# Patient Record
Sex: MALE | Race: WHITE | NOT HISPANIC OR LATINO | Employment: OTHER | URBAN - METROPOLITAN AREA
[De-identification: names, ages, dates, MRNs, and addresses within clinical notes are randomized per-mention and may not be internally consistent; named-entity substitution may affect disease eponyms.]

---

## 2018-08-24 ENCOUNTER — TRANSCRIBE ORDERS (OUTPATIENT)
Dept: ADMINISTRATIVE | Facility: HOSPITAL | Age: 83
End: 2018-08-24

## 2018-08-24 DIAGNOSIS — R05.9 COUGH: ICD-10-CM

## 2018-08-24 DIAGNOSIS — R52 PAIN: ICD-10-CM

## 2018-08-24 DIAGNOSIS — R63.4 WEIGHT LOSS: Primary | ICD-10-CM

## 2018-08-30 ENCOUNTER — HOSPITAL ENCOUNTER (OUTPATIENT)
Dept: RADIOLOGY | Facility: MEDICAL CENTER | Age: 83
Discharge: HOME | End: 2018-08-30
Payer: COMMERCIAL

## 2018-08-30 DIAGNOSIS — R05.9 COUGH: ICD-10-CM

## 2018-08-30 DIAGNOSIS — R63.4 WEIGHT LOSS: ICD-10-CM

## 2018-08-30 PROCEDURE — 74176 CT ABD & PELVIS W/O CONTRAST: CPT

## 2018-08-30 PROCEDURE — 71250 CT THORAX DX C-: CPT

## 2018-11-01 ENCOUNTER — HOSPITAL ENCOUNTER (INPATIENT)
Facility: HOSPITAL | Age: 83
LOS: 9 days | Discharge: NURSING, REHAB OR LONG-TERM CARE | DRG: 155 | End: 2018-11-10
Attending: INTERNAL MEDICINE | Admitting: SURGERY
Payer: COMMERCIAL

## 2018-11-01 DIAGNOSIS — R33.9 URINARY RETENTION: ICD-10-CM

## 2018-11-01 DIAGNOSIS — I10 ESSENTIAL HYPERTENSION: ICD-10-CM

## 2018-11-01 DIAGNOSIS — I95.1 ORTHOSTATIC HYPOTENSION: ICD-10-CM

## 2018-11-01 DIAGNOSIS — I48.91 NEW ONSET ATRIAL FIBRILLATION (HCC): ICD-10-CM

## 2018-11-01 DIAGNOSIS — E44.0 MODERATE PROTEIN-CALORIE MALNUTRITION (HCC): ICD-10-CM

## 2018-11-01 DIAGNOSIS — J39.2 NASOPHARYNGEAL MASS: Primary | ICD-10-CM

## 2018-11-01 PROBLEM — E11.9 TYPE 2 DIABETES MELLITUS, WITHOUT LONG-TERM CURRENT USE OF INSULIN (HCC): Status: ACTIVE | Noted: 2018-11-01

## 2018-11-01 PROBLEM — R29.6 RECURRENT FALLS: Status: ACTIVE | Noted: 2018-11-01

## 2018-11-01 LAB — GLUCOSE SERPL-MCNC: 119 MG/DL (ref 65–140)

## 2018-11-01 PROCEDURE — 82948 REAGENT STRIP/BLOOD GLUCOSE: CPT

## 2018-11-01 PROCEDURE — 93005 ELECTROCARDIOGRAM TRACING: CPT

## 2018-11-01 PROCEDURE — 99223 1ST HOSP IP/OBS HIGH 75: CPT | Performed by: INTERNAL MEDICINE

## 2018-11-01 RX ORDER — AMLODIPINE BESYLATE 5 MG/1
5 TABLET ORAL DAILY
Status: DISCONTINUED | OUTPATIENT
Start: 2018-11-02 | End: 2018-11-05

## 2018-11-01 RX ORDER — ATORVASTATIN CALCIUM 40 MG/1
40 TABLET, FILM COATED ORAL
Status: DISCONTINUED | OUTPATIENT
Start: 2018-11-02 | End: 2018-11-10 | Stop reason: HOSPADM

## 2018-11-01 RX ORDER — ACETAMINOPHEN 325 MG/1
650 TABLET ORAL EVERY 6 HOURS PRN
Status: DISCONTINUED | OUTPATIENT
Start: 2018-11-01 | End: 2018-11-10 | Stop reason: HOSPADM

## 2018-11-01 RX ORDER — LISINOPRIL 20 MG/1
20 TABLET ORAL EVERY 12 HOURS
Status: DISCONTINUED | OUTPATIENT
Start: 2018-11-01 | End: 2018-11-03

## 2018-11-01 RX ORDER — METOPROLOL SUCCINATE 50 MG/1
50 TABLET, EXTENDED RELEASE ORAL DAILY
Status: DISCONTINUED | OUTPATIENT
Start: 2018-11-02 | End: 2018-11-06

## 2018-11-01 RX ORDER — PANTOPRAZOLE SODIUM 40 MG/1
40 TABLET, DELAYED RELEASE ORAL
Status: DISCONTINUED | OUTPATIENT
Start: 2018-11-02 | End: 2018-11-10 | Stop reason: HOSPADM

## 2018-11-01 RX ADMIN — LISINOPRIL 20 MG: 20 TABLET ORAL at 22:59

## 2018-11-02 PROBLEM — I25.10 CAD (CORONARY ARTERY DISEASE): Status: ACTIVE | Noted: 2018-11-02

## 2018-11-02 LAB
ANION GAP SERPL CALCULATED.3IONS-SCNC: 3 MMOL/L (ref 4–13)
ATRIAL RATE: 228 BPM
BUN SERPL-MCNC: 15 MG/DL (ref 5–25)
CALCIUM SERPL-MCNC: 8.4 MG/DL (ref 8.3–10.1)
CHLORIDE SERPL-SCNC: 105 MMOL/L (ref 100–108)
CO2 SERPL-SCNC: 26 MMOL/L (ref 21–32)
CREAT SERPL-MCNC: 1.09 MG/DL (ref 0.6–1.3)
ERYTHROCYTE [DISTWIDTH] IN BLOOD BY AUTOMATED COUNT: 16.6 % (ref 11.6–15.1)
EST. AVERAGE GLUCOSE BLD GHB EST-MCNC: 120 MG/DL
GFR SERPL CREATININE-BSD FRML MDRD: 62 ML/MIN/1.73SQ M
GLUCOSE SERPL-MCNC: 102 MG/DL (ref 65–140)
GLUCOSE SERPL-MCNC: 109 MG/DL (ref 65–140)
GLUCOSE SERPL-MCNC: 114 MG/DL (ref 65–140)
GLUCOSE SERPL-MCNC: 132 MG/DL (ref 65–140)
GLUCOSE SERPL-MCNC: 139 MG/DL (ref 65–140)
HBA1C MFR BLD: 5.8 % (ref 4.2–6.3)
HCT VFR BLD AUTO: 33.8 % (ref 36.5–49.3)
HGB BLD-MCNC: 10.5 G/DL (ref 12–17)
MCH RBC QN AUTO: 25.5 PG (ref 26.8–34.3)
MCHC RBC AUTO-ENTMCNC: 31.1 G/DL (ref 31.4–37.4)
MCV RBC AUTO: 82 FL (ref 82–98)
PLATELET # BLD AUTO: 102 THOUSANDS/UL (ref 149–390)
PMV BLD AUTO: 11.8 FL (ref 8.9–12.7)
POTASSIUM SERPL-SCNC: 4 MMOL/L (ref 3.5–5.3)
QRS AXIS: 78 DEGREES
QRSD INTERVAL: 86 MS
QT INTERVAL: 436 MS
QTC INTERVAL: 424 MS
RBC # BLD AUTO: 4.12 MILLION/UL (ref 3.88–5.62)
SODIUM SERPL-SCNC: 134 MMOL/L (ref 136–145)
T WAVE AXIS: 20 DEGREES
VENTRICULAR RATE: 57 BPM
WBC # BLD AUTO: 6.69 THOUSAND/UL (ref 4.31–10.16)

## 2018-11-02 PROCEDURE — 97165 OT EVAL LOW COMPLEX 30 MIN: CPT

## 2018-11-02 PROCEDURE — 85027 COMPLETE CBC AUTOMATED: CPT | Performed by: INTERNAL MEDICINE

## 2018-11-02 PROCEDURE — 93010 ELECTROCARDIOGRAM REPORT: CPT | Performed by: INTERNAL MEDICINE

## 2018-11-02 PROCEDURE — 99232 SBSQ HOSP IP/OBS MODERATE 35: CPT | Performed by: INTERNAL MEDICINE

## 2018-11-02 PROCEDURE — 82948 REAGENT STRIP/BLOOD GLUCOSE: CPT

## 2018-11-02 PROCEDURE — G8987 SELF CARE CURRENT STATUS: HCPCS

## 2018-11-02 PROCEDURE — 83036 HEMOGLOBIN GLYCOSYLATED A1C: CPT | Performed by: INTERNAL MEDICINE

## 2018-11-02 PROCEDURE — 80048 BASIC METABOLIC PNL TOTAL CA: CPT | Performed by: INTERNAL MEDICINE

## 2018-11-02 PROCEDURE — 99223 1ST HOSP IP/OBS HIGH 75: CPT | Performed by: NEUROLOGICAL SURGERY

## 2018-11-02 RX ORDER — SODIUM CHLORIDE 9 MG/ML
75 INJECTION, SOLUTION INTRAVENOUS CONTINUOUS
Status: DISCONTINUED | OUTPATIENT
Start: 2018-11-02 | End: 2018-11-05

## 2018-11-02 RX ORDER — ECHINACEA PURPUREA EXTRACT 125 MG
1 TABLET ORAL EVERY 4 HOURS PRN
Status: DISCONTINUED | OUTPATIENT
Start: 2018-11-02 | End: 2018-11-04

## 2018-11-02 RX ADMIN — ATORVASTATIN CALCIUM 40 MG: 40 TABLET, FILM COATED ORAL at 16:09

## 2018-11-02 RX ADMIN — PANTOPRAZOLE SODIUM 40 MG: 40 TABLET, DELAYED RELEASE ORAL at 05:21

## 2018-11-02 RX ADMIN — ENOXAPARIN SODIUM 40 MG: 40 INJECTION SUBCUTANEOUS at 09:00

## 2018-11-02 RX ADMIN — LISINOPRIL 20 MG: 20 TABLET ORAL at 23:08

## 2018-11-02 RX ADMIN — ACETAMINOPHEN 650 MG: 325 TABLET, FILM COATED ORAL at 04:54

## 2018-11-02 RX ADMIN — LISINOPRIL 20 MG: 20 TABLET ORAL at 09:00

## 2018-11-02 RX ADMIN — METOPROLOL SUCCINATE 50 MG: 50 TABLET, EXTENDED RELEASE ORAL at 09:00

## 2018-11-02 RX ADMIN — AMLODIPINE BESYLATE 5 MG: 5 TABLET ORAL at 08:59

## 2018-11-02 RX ADMIN — SODIUM CHLORIDE 75 ML/HR: 0.9 INJECTION, SOLUTION INTRAVENOUS at 16:11

## 2018-11-02 NOTE — UTILIZATION REVIEW
[address] [place] [address] in the [facility] by Silvestre Sultana for 2017  Network Utilization Review Department  Phone: [phone]; Fax [phone]  ATTENTION: The Network Utilization Review Department is now centralized for our 7 Facilities  Please call with any questions or concerns to [phone] and carefully follow the prompts so that you are directed to the right person  All voicemails are confidential  Fax any determinations, approvals, denials, and requests for initial or continue stay review clinical to [phone]  Due to HIGH CALL volume, it would be easier if you could please send faxed requests to expedite your requests and in part, help us provide discharge notifications faster   /////////////////////////////////////////////////////////////////////////////////////////////////////////////////    Initial Clinical Review    Admission: Date/Time/Statement: 11/1/18 @ 2024     Orders Placed This Encounter   Procedures    Inpatient Admission     Standing Status:   Standing     Number of Occurrences:   1     Order Specific Question:   Admitting Physician     Answer:   Mechelle Fry [[id number]]     Order Specific Question:   Level of Care     Answer:   Med Surg [16]     Order Specific Question:   Estimated length of stay     Answer:   More than 2 Midnights     Order Specific Question:   Certification     Answer:   I certify that inpatient services are medically necessary for this patient for a duration of greater than two midnights  See H&P and MD Progress Notes for additional information about the patient's course of treatment  History of Illness:   80 y o  male who presents as a transfer from [facility] with 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  The patient had presented to rehab [facility] on 10/29/18 with recurrent falls  He recently was started on Lasix 40 mg b i d  by his primary care physician for lower extremity edema    He was noted to be orthostatic on presentation acute related to dehydration from diuretic use  Diuretics have since been discontinued  Patient was hydrated with IV fluids and had resolution of orthostatic hypotension prior to transfer  Evaluation which involved imaging studies of his head revealed a large nasopharyngeal mass for which patient was transferred to [address] [place] [address] for neurosurgical evaluation with Dr Jagjit Angelo    reports left nasal congestion over the past 6 weeks with transient relief when he uses Afrin  He also reports early morning headache relieved with Tylenol  An MRI of the brain done at [facility] showed a large left-sided nasopharyngeal/skull base mass measuring 6 9 x 5 5 cm which had grown in size since 2016 when it was 3 3 x 2 8 cm  Patient reports he had been evaluated 2 years ago at [facility] and at that time no intervention was recommended  98 6  57   18   163/65  88 5 kg (195 lb)    Abnormal Labs/Diagnostic Test Results:   Na  134  An gap  3  hgb  10 5  hct  33 8    Past Medical/Surgical History:   CAD, hemoptysis, HTN, prostate CA    Admitting Diagnosis: Nasal mass [J34 9]    Assessment/Plan:   Nasopharyngeal mass w/involvement of skull base -  Scheduled for neurosurgical evaluation/ intervention    Orthostatic hypotension - currently denies any dizziness but cautious/slow when standing up;  Monitor orthostatic bp's ;  No IVF at this time    New Onset AFIb -  Rate controlled on beta blocker  (Coumadinon hold pending neurosurgery evaluation)   ECHO unremarkable showing normal systolic function    Recurrent falls PTA -  Get PT/OT evals    VTE Prophylaxis: Enoxaparin (Lovenox)  / sequential compression device       Patient will be admitted on an Inpatient basis with an anticipated length of stay of  > 2 midnights     Justification for Hospital Stay:  Nasopharyngeal mass    Admission Orders:  Admit med surg  Sequential compression device to b/l LE  Consult neurosurgery  PT/OT eval and treat  Fall precautions  accucks qid w/sliding scale insulin  I/O q shift  Orthostatic bp's   Up w/assist  Lo carb diet    11/2/2018    98 6         HR 55-77  (48 x1)     RR 18       135/76       Scheduled Meds:   Current Facility-Administered Medications:  acetaminophen 650 mg Oral Q6H PRN Mat Mora MD   amLODIPine 5 mg Oral Daily Mat Mora MD   atorvastatin 40 mg Oral Daily With Susanna Pedersen MD   enoxaparin 40 mg Subcutaneous Daily Mat Mora MD   insulin lispro 1-5 Units Subcutaneous HS Mat Mora MD   insulin lispro 1-6 Units Subcutaneous TID AC Antione Hardy MD   lisinopril 20 mg Oral Q12H Mat Mora MD   metoprolol succinate 50 mg Oral Daily Mat Mora MD   pantoprazole 40 mg Oral Early Morning Mat Mora MD     (awaiting evaluation by neurosurgery at time of this review)

## 2018-11-02 NOTE — ASSESSMENT & PLAN NOTE
· POA at [facility]  Symptoms were attributed to dehydration as patient had been on Lasix for bilateral lower extremity edema  Symptoms said to have resolved with IV fluids prior to transfer  · Patient currently denies dizziness but reports he is cautious when getting up  · Will check repeat orthostatic vitals  · Hold off on further IV fluids at this time    Lasix discontinued

## 2018-11-02 NOTE — ASSESSMENT & PLAN NOTE
· Rate controlled    He was started on Coumadin at [facility] which is now on hold pending neurosurgery evaluation  · Continue beta-blocker  · Echocardiogram done at [facility] was unremarkable showing normal systolic function and no significant valvular disease

## 2018-11-02 NOTE — ASSESSMENT & PLAN NOTE
- transferred from [facility] for nasopharyngeal/skull base mass measuring 6 9 x 5 5 cm on CT imaging which previously measured 3 3 x 2 8 cm in July 2016  - would ideally need biopsy for definitive diagnosis and treatment plan - appreciate neurosurgery input who recommended ENT consult to proceed with biopsy - discussed with ENT after their discussion with family and plan will be to pursue transnasal approach biopsy in the outpatient setting approximately one week post-discharge  - PRN pain control and supportive care otherwise    - PRN nasal spray for congestion    - speech/swallow evaluation appreciated - now recommending mechanical soft diet with thin liquids

## 2018-11-02 NOTE — CONSULTS
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  2  HTN  3  New onset a-fib  4  Orthostatic hyptension  5  Recurrent falls  6  Diabetes mellitus type II    Plan:  · Exam:  Alert and oriented x 3, very nasally speech, slightly muffled, and not wearing dentures at the time of exam, strength 5/5 BUE and 4+/5 BLE, reflexes 3+ and brisk BUE, 2+ in BLE, +martin bilaterally, no clonus noted, slight left drift, JPS 3/3, DST intact  · Imaging reviewed personally and with attending  Results are as follows:  · Pending image input into PACS from CD from [facility]  · Repeat CT head STAT if GCS declines > 2pts/1hr  · Medical management per primary team  · DVT ppx: SCDs and Lovenox  · Mobilize as tolerated with assistance  · PT / OT  · Neurosurgery will continue to follow  Surgical intervention is dependent on imaging results  Will view imaging sent over from [facility] once uploaded to PACS and assess if additional imaging is needed at this time  Please call with questions or concerns    History of Present Illness   HPI: Dionicio Barker is a 80y o  year old male with PMH including HTN, new onset a-fib, orthostatic hypotension, and diabetes mellitus type II who presented as a transfer from [facility] for nasopharyngeal mass found on CT head  He was seen at [facility] for recurrent falls  He admits to 4 falls in the past 8 days  He reports every fall is preceded by dizziness and black spots in his vision and then once he hits the floor he wakes up  On three occasions he had help to get up but on the fourth fall he was alone and called the ambulance   He has associated neck and mid back pain, mostly located under right shoulder, which he considers a 5/10, stabbing, "nerve pain "  He complains of 6-7 weeks of severe left sided nasal congestion with sinus pain which is not made better with Afrin  At times, this makes breathing difficult, especially if he get right nasal congestion and must breathe through his mouth  He reports 5/10 frontal headaches every day for the past 1-2 weeks for which he takes tylenol for and this helps  He admits to unintentional 33lb weight loss in 3 months due to decreased appetite  He has some intermittent, mild nausea  Patient reports mild numbness in bilateral finger tips, which has started prior to falling  He has a burning sensation on bilateral posterior thighs and numbness and cold sensation in bilateral legs below the knees from neuropathy  He reports that family members have noticed a change in his voice and at times he can be hard to understand  He denies confusion, change in bowel or bladder, changes in vision or hearing  Patient states 2 years ago he was told he had a nasopharyngeal mass but no intervention was recommended at that time  Patient ambulates with a walker at all times  He states he used agranox? Then was switched to ecotrin? And then placed on warfarin at [facility]  He denies other blood thinner use at home  Review of Systems   Constitutional: Positive for appetite change and unexpected weight change  Negative for chills and fever  Fatigue: 33lbs in 3 months  HENT: Positive for congestion (L>>R), sinus pain, sinus pressure and voice change  Negative for hearing loss, sore throat, tinnitus and trouble swallowing  Eyes: Negative for visual disturbance  Respiratory: Negative for chest tightness  Trouble breathing when very congested   Cardiovascular: Negative for chest pain  Gastrointestinal: Positive for constipation (chronic) and nausea (mild)   Negative for abdominal pain, diarrhea and vomiting  Genitourinary: Negative for difficulty urinating  Musculoskeletal: Positive for arthralgias (hands), back pain and neck pain  Skin: Negative for rash  Allergic/Immunologic: Negative for environmental allergies and food allergies  Neurological: Positive for dizziness, speech difficulty, light-headedness, numbness and headaches  Negative for facial asymmetry and weakness  Hematological: Bruises/bleeds easily  Psychiatric/Behavioral: Negative for confusion  The patient is not nervous/anxious          Historical Information   Past Medical History:   Diagnosis Date    Coronary artery disease     Diabetes mellitus ([facility] )     Hemoptysis     Hypertension     Prostate cancer ([facility] )      Past Surgical History:   Procedure Laterality Date    BRONCHOSCOPY      CORONARY ANGIOPLASTY WITH STENT PLACEMENT      PROSTATECTOMY       History   Alcohol Use No     History   Drug Use No     History   Smoking Status    Former Smoker    Packs/day: 1 50    Years: 40 00    Quit date: 12/31/2001   Smokeless Tobacco    Never Used     Family History   Problem Relation Age of Onset    Heart disease Father        Meds/Allergies   all current active meds have been reviewed, current meds:   Current Facility-Administered Medications   Medication Dose Route Frequency    acetaminophen (TYLENOL) tablet 650 mg  650 mg Oral Q6H PRN    amLODIPine (NORVASC) tablet 5 mg  5 mg Oral Daily    atorvastatin (LIPITOR) tablet 40 mg  40 mg Oral Daily With Dinner    enoxaparin (LOVENOX) subcutaneous injection 40 mg  40 mg Subcutaneous Daily    insulin lispro (HumaLOG) 100 units/mL subcutaneous injection 1-5 Units  1-5 Units Subcutaneous HS    insulin lispro (HumaLOG) 100 units/mL subcutaneous injection 1-6 Units  1-6 Units Subcutaneous TID AC    lisinopril (ZESTRIL) tablet 20 mg  20 mg Oral Q12H    metoprolol succinate (TOPROL-XL) 24 hr tablet 50 mg  50 mg Oral Daily    pantoprazole (PROTONIX) EC tablet 40 mg 40 mg Oral Early Morning    and PTA meds:   None     Allergies   Allergen Reactions    Plavix [Clopidogrel] Rash       Objective   I/O       10/31 0701 - 11/01 0700 11/01 0701 - 11/02 0700 11/02 0701 - 11/03 0700    P  O    240    Total Intake(mL/kg)   240 (2 7)    Urine (mL/kg/hr)  611 730 (2 2)    Total Output   611 730    Net   -611 -490           Unmeasured Urine Occurrence  1 x           Physical Exam   Constitutional: He is oriented to person, place, and time  Vital signs are normal  He appears well-developed and well-nourished  He is cooperative  HENT:   Head: Normocephalic and atraumatic  Eyes: Pupils are equal, round, and reactive to light  EOM are normal  Left conjunctiva has a hemorrhage (left lower midline)  Neck: Normal range of motion  Cardiovascular: Normal rate  Pulmonary/Chest: Effort normal  No respiratory distress  Abdominal: Soft  There is no tenderness  Musculoskeletal:   No back midline tenderness  Paraspinal tenderness on right side under shoulder blade  Neurological: He is alert and oriented to person, place, and time  He has a normal Finger-Nose-Finger Test    Reflex Scores:       Bicep reflexes are 3+ on the right side and 3+ on the left side  Brachioradialis reflexes are 3+ on the right side and 3+ on the left side  Patellar reflexes are 2+ on the right side and 2+ on the left side  Achilles reflexes are 2+ on the right side and 2+ on the left side  Skin: Skin is warm, dry and intact  Psychiatric: He has a normal mood and affect  His behavior is normal  Judgment and thought content normal  Cognition and memory are normal    Speech was very nasally; patient did not have dentures in which made speech slightly muffled and difficult to understand     Neurologic Exam     Mental Status   Oriented to person, place, and time  Registration: recalls 2 of 3 objects (multiple choice had to be given for one word)  Recall at 5 minutes: recalls 1 of 3 objects  Follows 1 step commands  Attention: normal  Concentration: normal    Speech: (Slightly muffled and very nasally; patient not wearing dentures)  Level of consciousness: alert  Knowledge: good  Able to perform simple calculations  Able to name object  Able to repeat  Normal comprehension  Cranial Nerves     CN II   Right visual field deficit: none  Left visual field deficit: none     CN III, IV, VI   Pupils are equal, round, and reactive to light  Extraocular motions are normal    CN III: no CN III palsy  CN VI: no CN VI palsy  Nystagmus: none   Diplopia: none  Ophthalmoparesis: none  Upgaze: normal  Downgaze: normal  Conjugate gaze: present    CN V   Right facial sensation deficit: none  Left facial sensation deficit: none    CN VII   Right facial weakness: none  Left facial weakness: none    CN VIII   Hearing: intact    CN IX, X   CN IX normal    CN X normal      CN XI   Right trapezius strength: normal  Left trapezius strength: normal    CN XII   CN XII normal      Motor Exam   Muscle bulk: normal  Overall muscle tone: normal  Right arm pronator drift: absent  Left arm pronator drift: present (slight)    Strength   Strength 5/5 except as noted  BLE 4+/5     Sensory Exam   Light touch normal    Proprioception normal    DST intact     Gait, Coordination, and Reflexes     Coordination   Finger to nose coordination: normal    Tremor   Resting tremor: absent  Intention tremor: absent  Action tremor: absent    Reflexes   Right brachioradialis: 3+  Left brachioradialis: 3+  Right biceps: 3+  Left biceps: 3+  Right patellar: 2+  Left patellar: 2+  Right achilles: 2+  Left achilles: 2+  Right : 2+  Left : 2+  Right Trammell: present  Left Trammell: present  Right ankle clonus: absent  Left ankle clonus: absent      Vitals:Blood pressure 135/76, pulse 59, temperature 98 6 °F (37 °C), height 6' 1" (1 854 m), weight 88 5 kg (195 lb), SpO2 97 %  ,Body mass index is 25 73 kg/m²       Lab Results:     Results from last 7 days  Lab Units 11/02/18  0600   WBC Thousand/uL 6 69   HEMOGLOBIN g/dL 10 5*   HEMATOCRIT % 33 8*   PLATELETS Thousands/uL 102*       Results from last 7 days  Lab Units 11/02/18  0600   POTASSIUM mmol/L 4 0   CHLORIDE mmol/L 105   CO2 mmol/L 26   BUN mg/dL 15   CREATININE mg/dL 1 09   CALCIUM mg/dL 8 4                 No results found for: TROPONINT  ABG:No results found for: PHART, YLA7VWL, PO2ART, PSS5WWN, I0AMNTNR, BEART, SOURCE    Imaging Studies: I have personally reviewed pertinent reports  and I have personally reviewed pertinent films in PACS    EKG, Pathology, and Other Studies: I have personally reviewed pertinent reports  VTE Prophylaxis: Sequential compression device (Venodyne)  and Enoxaparin (Lovenox)    Code Status: Level 1 - Full Code  Advance Directive and Living Will:      Power of :    POLST:      Counseling / Coordination of Care  I spent 1 hour with the patient

## 2018-11-02 NOTE — ASSESSMENT & PLAN NOTE
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

## 2018-11-02 NOTE — PLAN OF CARE
Problem: DISCHARGE PLANNING - CARE MANAGEMENT  Goal: Discharge to post-acute care or home with appropriate resources  INTERVENTIONS:  - Conduct assessment to determine patient/family and health care team treatment goals, and need for post-acute services based on payer coverage, community resources, and patient preferences, and barriers to discharge  - Address psychosocial, clinical, and financial barriers to discharge as identified in assessment in conjunction with the patient/family and health care team  - Arrange appropriate level of post-acute services according to patient's   needs and preference and payer coverage in collaboration with the physician and health care team  - Communicate with and update the patient/family, physician, and health care team regarding progress on the discharge plan  - Arrange appropriate transportation to post-acute venues  - will follow for medically necessary resources  Outcome: Progressing

## 2018-11-02 NOTE — OCCUPATIONAL THERAPY NOTE
[address] Evaluation     Patient Name: Rakan Couch  QEDRB'X Date: 11/2/2018  Problem List  Patient Active Problem List   Diagnosis    Nasopharyngeal mass    Orthostatic hypotension    New onset atrial fibrillation (HCC)    Type 2 diabetes mellitus, without long-term current use of insulin ([facility] )    Essential hypertension    Recurrent falls     Past Medical History  Past Medical History:   Diagnosis Date    Coronary artery disease     Diabetes mellitus ([facility] )     Hemoptysis     Hypertension     Prostate cancer ([facility] )      Past Surgical History  Past Surgical History:   Procedure Laterality Date    BRONCHOSCOPY      CORONARY ANGIOPLASTY WITH STENT PLACEMENT      PROSTATECTOMY           11/02/18 1200   Note Type   Note type Eval only   Restrictions/Precautions   Weight Bearing Precautions Per Order No   Other Precautions Multiple lines;Telemetry; Fall Risk;Pain   Pain Assessment   Pain Assessment 0-10   Pain Score 5   Pain Location Leg   Pain Orientation Bilateral;Posterior   Home Living   Type of [address] [place] [address] Two level; Able to live on main level with bedroom/bathroom   Bathroom Shower/Tub (Pt completed sponge bathing PTA)   Bathroom Toilet (Commode above toilet)   [address] [facility] [address]   Additional Comments Pt lives in a [facility] with first floor set-up  PTA pt sponge bathed and req freq rest breaks during ADLs  Pt made meals that didnt req heavy pots  He was able to dress himself I'ly with increased time and mobilized I'ly using a rw  Prior Function   Level of [place] Needs assistance with IADLs; Independent with ADLs and functional mobility   Lives With Spouse;Daughter; Other (Comment)  (Pt lives with wife (dementia), daughter and g daughter)   [address] Help From Family   ADL Assistance Independent   IADLs Needs assistance   Falls in the last 6 months 1 to 4   Vocational Retired   Comments Pt was I with ADLs PTA (sponge bathing, increased time and rest breaks)  Pt req assistance with IADLs that require lifitng heavier things (meals with heavy pots)   Lifestyle   Autonomy Pt was previously I with ADLs, req some assistance with more strenuous IADLs and used a rw for functional mobility  Pt does not drive   Service to Others Pt is a retired  and   Intrinsic Gratification Pt enjoys codes and puzzles  Psychosocial   Psychosocial (WDL) WDL   Subjective   Subjective Pt spoke a lot about how he has gotten weaker over time and reports that he frequently drops items that he picks up  ADL   Eating Assistance 7  Independent   Grooming Assistance 6  Modified Independent   UB Bathing Assistance 5  Supervision/Setup   LB Bathing Assistance 5  Supervision/Setup   UB Dressing Assistance 6  Modified independent   LB Dressing Assistance 6  Modified independent   Toileting Assistance  4  Minimal Assistance   Additional Comments Pt req increased time will all ADL tasks  Transfers   Sit to Stand 4  Minimal assistance   Additional items Verbal cues  (VCs for safe hand placement during transfer)   Stand to Sit 5  Supervision   Functional Mobility   Functional Mobility 5  Supervision   Additional Comments Pt took ~5 small side steps to move from the bed to chair with rw   Additional items Rolling walker   Balance   Static Sitting Fair +   Dynamic Sitting Fair   Static Standing Fair   Dynamic Standing Fair   Ambulatory Fair   Activity Tolerance   Activity Tolerance Patient limited by fatigue   Medical Staff Made Aware Yes, RN Shahrzad   Nurse Made Aware Yes   RUE Assessment   RUE Assessment WFL   LUE Assessment   LUE Assessment WFL   Hand Function   Gross Motor Coordination Functional   Fine Motor Coordination Functional   Sensation   Additional Comments Pt reports tingling in his feet (reports he experienced this PTA)    pt reports that he can feel items in his hands, however he frequently drops things   Cognition   Overall Cognitive Status [facility] Arousal/Participation Alert; Cooperative;Responsive   Attention Within functional limits   Orientation Level Oriented X4   Memory Within functional limits   Following Commands Follows all commands and directions without difficulty   Comments Pt does not demonstrate any cognitive deficits upon eval   Assessment   Limitation Decreased ADL status; Decreased endurance;Decreased high-level ADLs; Decreased self-care trans   Prognosis Fair   Assessment Pt is a 80year old female seen for OT eval s/p transfer from [facility] with recurrent falls, orthostatic, and dehydration  Imaginign studies of his head revealed a large nasopharyngeal mass and pt was transferred to [facility] for neurosurgical evaluation  Pt with active OT orders and up with assistance orders  Pt lives with his wife (dementia), daughter and granddaughter in a [facility]  Pt only used the second floor PTA  PTA, pt completed ADLs I'ly, but required assistance for IADLs that required more strength (cooked meals that didn't req heavy pots and pans), and ambulated with a rw  Pt is currently functioning at his baseline level, and it is not clinically recommended that he continue to receive skilled occupational therapy services while in the hospital   Pt scored overall 60/100 on the Barthel Index  Based on the aforementioned OT evaluation, functional performance, and assessments, pt has been identified as a low complexity evaluation  Recommend home with family support upon d/c   OT orders will be d/c at this time  Please re-consult OT if medically appropriate     Recommendation   OT Discharge Recommendation Home with family support   OT - OK to Discharge Yes  (when medically stable)   Barthel Index   Feeding 10   Bathing 5   Grooming Score 5   Dressing Score 10   Bladder Score 5   Bowels Score 10   Toilet Use Score 5   Transfers (Bed/Chair) Score 10   Mobility (Level Surface) Score 0   Stairs Score 0   Barthel Index Score 60     Viky Olivier, MODE, OTR/L

## 2018-11-02 NOTE — SOCIAL WORK
Met with patient and explained CM program and CM role  Resides with wife in a house, 2 [address], bed/bathroom first floor  Prior to admission was independent with ambulation and ADL's  PCP Dr Olga Lechuga  Has a prescription plan  Use [facility]  He does notr drive  DME: walker  Denies HHC or IP rehab utilization  Denies mental health illness, IP or OP psyche care  Denies drug and/or alcohol abuse  Primary contact is [phone]  Does not have a POA  States his daughter [place] will take him home    CM reviewed d/c planning process including the following: identifying help at home, patient preference for d/c planning needs, Discharge Lounge, [facility] Meds to Bed program, availability of treatment team to discuss questions or concerns patient and/or family may have regarding understanding medications and recognizing signs and symptoms once discharged  CM also encouraged patient to follow up with all recommended appointments after discharge  Patient advised of importance for patient and family to participate in managing patients medical well being  Patient/caregiver received discharge checklist  Content reviewed  Patient/caregiver encouraged to participate in discharge plan of care prior to discharge home

## 2018-11-02 NOTE — ASSESSMENT & PLAN NOTE
· Presented to [facility] with recurrent falls attributed to orthostatic hypotension from diuretic use and dehydration  · Will obtain PT/OT evaluation

## 2018-11-02 NOTE — PROGRESS NOTES
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 on CT imaging  - would ideally need biopsy for definitive diagnosis and treatment plan  - PRN pain control and supportive care otherwise   - PRN nasal spray for congestion      Orthostatic hypotension   Assessment & Plan    - prior to transfer, etiology was presumed to be recent Lasix use for lower extremity edema coupled with mild dehydration  - continue to hold Lasix - start gentle IV fluid hydration - recheck morning orthostatics  - will appreciate cardiology input  - check echocardiogram especially in light of new onset atrial flutter     New onset atrial flutter   Assessment & Plan    - EKGs noted - currently rate controlled on Toprol-XL  - Coumadin held anticipating neurosurgical intervention/biopsy   - will appreciate cardiology input as persistent orthostasis may limit use of rate-controlling agents     Essential hypertension   Assessment & Plan    - continue Zestril/Norvasc/Toprol-XL     CAD (coronary artery disease)   Assessment & Plan    - status post prior stenting  - continue Lipitor/Zestril/Toprol-XL      Diabetes mellitus type 2   Assessment & Plan    - HbA1c well controlled at 5 8   - on SSI coverage per Accu-Cheks       VTE Prophylaxis:  Lovenox       SUBJECTIVE     Seen/examined earlier this morning with nursing during rounds  No acute overnight events  Patient states that his dizziness has somewhat improved from presentation yesterday  Denies any headaches or blurry vision at this time  Does complain of some nasal congestion however        OBJECTIVE     Vitals: Temp (24hrs), Av 6 °F (37 °C), Min:98 6 °F (37 °C), Max:98 6 °F (37 °C)    Temp:  [98 6 °F (37 °C)] 98 6 °F (37 °C)  HR:  [48-88] 59  BP: (106-163)/(51-76) 112/51  SpO2:  [89 %-99 %] 97 %  Body mass index is 25 73 kg/m²  Input and Output Summary (last 24 hours):        Intake/Output Summary (Last 24 hours) at 18 1607  Last data filed at 18 1300   Gross per 24 hour   Intake              480 ml   Output             1766 ml   Net            -1286 ml       Physical Exam:     GENERAL:  Well-developed/nourished - no immediate distress  HEAD:  Normocephalic - atraumatic - no sinus tenderness  EYES: PERRL - EOMI - mildly inflamed left conjunctiva  MOUTH:  Mucosa moist  NECK:  Supple - full range of motion  CARDIAC:  Regular rate/rhythm - S1/S2 positive  PULMONARY:  Clear to auscultation bilaterally - nonlabored respirations  ABDOMEN:  Soft - nontender/nondistended - active bowel sounds  MUSCULOSKELETAL:  Motor strength/range of motion deconditioned  NEUROLOGIC:  Alert/oriented   SKIN:  Chronic wrinkles/blemishes   PSYCHIATRIC:  Mood/affect stable      ADDITIONAL DATA       Labs & Recent Cultures:       Results from last 7 days  Lab Units 18  0600   WBC Thousand/uL 6 69   HEMOGLOBIN g/dL 10 5*   HEMATOCRIT % 33 8*   PLATELETS Thousands/uL 102*       Results from last 7 days  Lab Units 18  0600   POTASSIUM mmol/L 4 0   CHLORIDE mmol/L 105   CO2 mmol/L 26   BUN mg/dL 15   CREATININE mg/dL 1 09   CALCIUM mg/dL 8 4         Last 24 Hours Medication List:     Current Facility-Administered Medications:  acetaminophen 650 mg Oral Q6H PRN Ghislaine Deshpande MD   amLODIPine 5 mg Oral Daily Ghislaine Deshpande MD   atorvastatin 40 mg Oral Daily With Belgica Fabian MD   enoxaparin 40 mg Subcutaneous Daily Ghislaine Deshpande MD   insulin lispro 1-5 Units Subcutaneous HS Ghislaine Deshpande MD   insulin lispro 1-6 Units Subcutaneous TID AC Aniceto Powell MD   lisinopril 20 mg Oral Q12H Ghislaine Deshpande MD   metoprolol succinate 50 mg Oral Daily Tru Ortiz MD   pantoprazole 40 mg Oral Early Morning Tru Ortiz MD   sodium chloride 1 spray Each Nare Q4H PRN Sandy Sagastume MD   sodium chloride 75 mL/hr Intravenous Continuous Sandy Sagastume MD          Time Spent for Care: 33 minutes  More than 50% of total time spent on counseling and coordination of care as described above  Current Length of Stay: 1 day(s)      Code Status: Level 1 - Full Code         ** Please Note: This note is constructed using a voice recognition dictation system   **

## 2018-11-02 NOTE — ASSESSMENT & PLAN NOTE
- prior to transfer, etiology was presumed to be recent Lasix use for lower extremity edema coupled with mild dehydration  - discontinued Lasix - serial orthostatic checks every morning (ordered)   - cardiology input appreciated who are okay w/ higher baseline BP readings to offset positional drops   - CT angiogram of head/neck NOT showing carotid compression  - echocardiogram reveals an EF of 55% with trace MR/TR but no evidence of LV wall motion abnormalities per reading cardiologist  - cardiology discontinued IV fluids and Norvasc but recommending continuation of Toprol-XL and ACEI at current doses for atrial flutter and hypertension respectively   - Patient still orthostatic today and symptomatic  Will add thigh high compression stockings and Florineg 0 1mg daily   Monitor with changes

## 2018-11-02 NOTE — ASSESSMENT & PLAN NOTE
- continue Zestril/Toprol-XL - Norvasc discontinued by cardiology  - as stated above, cardiology is okay with slightly higher baseline resting BP to offset positional drop from orthostasis

## 2018-11-02 NOTE — H&P
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  Symptoms were attributed to dehydration as patient had been on Lasix for bilateral lower extremity edema  Symptoms said to have resolved with IV fluids prior to transfer  · Patient currently denies dizziness but reports he is cautious when getting up  · Will check repeat orthostatic vitals  · Hold off on further IV fluids at this time  Lasix discontinued     New onset atrial fibrillation (HCC)   Assessment & Plan    · Rate controlled  He was started on Coumadin at [facility] which is now on hold pending neurosurgery evaluation  · Continue beta-blocker  · Echocardiogram done at [facility] was unremarkable showing normal systolic function and no significant valvular disease     Type 2 diabetes mellitus, without long-term current use of insulin (HCC)   Assessment & Plan    No results found for: HGBA1C    No results for input(s): POCGLU in the last 72 hours  Blood Sugar Average: Last 72 hrs:    · Hold glipizide, last hemoglobin A1c was 5 9 on 7/9/18    Will check a repeat this admission  · Accu-Cheks a c  HS with correctional scale insulin for now  · Start on basal/bolus insulin schedule as needed after review of blood glucose levels     Essential hypertension   Assessment & Plan    · Continue atenolol, lisinopril, amlodipine     Recurrent falls   Assessment & Plan    · Presented to [facility] with recurrent falls attributed to orthostatic hypotension from diuretic use and dehydration  · Will obtain PT/OT evaluation         VTE Prophylaxis: Enoxaparin (Lovenox)  / sequential compression device     Code Status: Full Code    POLST: POLST form is not discussed and not completed at this time  Anticipated Length of Stay:  Patient will be admitted on an Inpatient basis with an anticipated length of stay of  > 2 midnights  Justification for Hospital Stay:  Nasopharyngeal mass    Chief Complaint:     Nasopharyngeal mass, transferred for neurosurgical evaluation    History of Present Illness:  Courtney Lopez is a 80 y o  male who presents as a transfer from [facility] with 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  The patient had [facility] on 10/29/18 with recurrent falls  He recently was started on Lasix 40 mg b i d  by his primary care physician for lower extremity edema  He was noted to be orthostatic on presentation acute related to dehydration from diuretic use  Diuretics have since been discontinued  Patient was hydrated with IV fluids and had resolution of orthostatic hypotension prior to transfer  Evaluation which involved imaging studies of his head revealed a large nasopharyngeal mass for which patient was transferred to [facility] for neurosurgical evaluation with Dr Dez Peraza  He reports specifically left nasal congestion over the past 6 weeks with transient relief when he uses Afrin  He also reports early morning headache relieved with Tylenol  An MRI of the brain done at [facility] showed a large left-sided nasopharyngeal/skull base mass measuring 6 9 x 5 5 cm which had grown in size since 2016 when it was 3 3 x 2 8 cm  Patient reports he had been evaluated 2 years ago at [facility] and at that time no intervention was recommended  Review of Systems   Constitutional: Negative      HENT: Positive for congestion, nosebleeds, sinus pain, sinus pressure and voice change  Hot potato voice   Eyes: Negative  Respiratory: Negative  Cardiovascular: Negative  Gastrointestinal: Positive for constipation  Negative for abdominal pain, nausea and vomiting  Genitourinary: Negative  Musculoskeletal: Negative  Skin: Negative  Neurological: Positive for headaches  Negative for dizziness, syncope and light-headedness  Psychiatric/Behavioral: Negative  All other systems reviewed and are negative  Past Medical History:   Diagnosis Date    Coronary artery disease     Diabetes mellitus ([facility] )     Hemoptysis     Hypertension     Prostate cancer ([facility] )        Past Surgical History:   Procedure Laterality Date    BRONCHOSCOPY      CORONARY ANGIOPLASTY WITH STENT PLACEMENT      PROSTATECTOMY         Family History:  Family History   Problem Relation Age of Onset    Heart disease Father        Home Meds:  all medications and allergies reviewed    Allergies: Allergies   Allergen Reactions    Plavix [Clopidogrel] Rash         Marital Status: Unknown     History   Alcohol Use No     History   Smoking Status    Former Smoker    Packs/day: 1 50    Years: 40 00    Quit date: 12/31/2001   Smokeless Tobacco    Not on file     History   Drug Use No           Physical Exam:   Vitals:   Height: 6' 1" (185 4 cm) (11/01/18 2053)  Weight - Scale: 88 5 kg (195 lb) (11/01/18 2053)    Physical Exam   Constitutional: He is oriented to person, place, and time  He appears well-developed and well-nourished  No distress  HENT:   Head: Normocephalic and atraumatic  Eyes: Conjunctivae and EOM are normal  Right eye exhibits no discharge  Left eye exhibits no discharge  No scleral icterus  Neck: Normal range of motion  Neck supple  Cardiovascular: Normal rate  An irregularly irregular rhythm present  No murmur heard  Pulmonary/Chest: Effort normal and breath sounds normal  No respiratory distress  Abdominal: Soft   Bowel sounds are normal  He exhibits no distension and no mass  There is no tenderness  Musculoskeletal: Normal range of motion  He exhibits no edema or tenderness  Soft tissue mass noted right calf laterally (likely lipoma)   Neurological: He is alert and oriented to person, place, and time  Skin: Skin is warm and dry  No rash noted  He is not diaphoretic  No erythema  Psychiatric: He has a normal mood and affect  His behavior is normal    Vitals reviewed  Lab Results: Lab results (CBC, BMP, LFTs, INR, procalcitonin) from [facility] reviewed and unremarkable      Imaging: MRI/CT brain from [facility] results reviewed      EKG, Pathology, and Other Studies Reviewed on Admission:   · EKG pending this admission    ** Please Note: Dragon 360 Dictation voice to text software may have been used in the creation of this document   **

## 2018-11-02 NOTE — ASSESSMENT & PLAN NOTE
- EKGs noted - currently rate controlled on Toprol-XL  - Coumadin will remain held anticipating biopsy within a week post discharge  - recent diagnosis at [facility] prior to transfer

## 2018-11-03 LAB
BASOPHILS # BLD AUTO: 0.02 THOUSANDS/ΜL (ref 0–0.1)
BASOPHILS NFR BLD AUTO: 0 % (ref 0–1)
EOSINOPHIL # BLD AUTO: 0.04 THOUSAND/ΜL (ref 0–0.61)
EOSINOPHIL NFR BLD AUTO: 1 % (ref 0–6)
ERYTHROCYTE [DISTWIDTH] IN BLOOD BY AUTOMATED COUNT: 16.3 % (ref 11.6–15.1)
GLUCOSE SERPL-MCNC: 122 MG/DL (ref 65–140)
GLUCOSE SERPL-MCNC: 127 MG/DL (ref 65–140)
GLUCOSE SERPL-MCNC: 129 MG/DL (ref 65–140)
GLUCOSE SERPL-MCNC: 135 MG/DL (ref 65–140)
HCT VFR BLD AUTO: 36 % (ref 36.5–49.3)
HGB BLD-MCNC: 11.1 G/DL (ref 12–17)
IMM GRANULOCYTES # BLD AUTO: 0.02 THOUSAND/UL (ref 0–0.2)
IMM GRANULOCYTES NFR BLD AUTO: 0 % (ref 0–2)
LYMPHOCYTES # BLD AUTO: 1.23 THOUSANDS/ΜL (ref 0.6–4.47)
LYMPHOCYTES NFR BLD AUTO: 22 % (ref 14–44)
MCH RBC QN AUTO: 25.6 PG (ref 26.8–34.3)
MCHC RBC AUTO-ENTMCNC: 30.8 G/DL (ref 31.4–37.4)
MCV RBC AUTO: 83 FL (ref 82–98)
MONOCYTES # BLD AUTO: 0.39 THOUSAND/ΜL (ref 0.17–1.22)
MONOCYTES NFR BLD AUTO: 7 % (ref 4–12)
NEUTROPHILS # BLD AUTO: 3.88 THOUSANDS/ΜL (ref 1.85–7.62)
NEUTS SEG NFR BLD AUTO: 70 % (ref 43–75)
NRBC BLD AUTO-RTO: 0 /100 WBCS
PLATELET # BLD AUTO: 100 THOUSANDS/UL (ref 149–390)
PMV BLD AUTO: 10.9 FL (ref 8.9–12.7)
RBC # BLD AUTO: 4.34 MILLION/UL (ref 3.88–5.62)
WBC # BLD AUTO: 5.58 THOUSAND/UL (ref 4.31–10.16)

## 2018-11-03 PROCEDURE — 82948 REAGENT STRIP/BLOOD GLUCOSE: CPT

## 2018-11-03 PROCEDURE — G8978 MOBILITY CURRENT STATUS: HCPCS

## 2018-11-03 PROCEDURE — 97163 PT EVAL HIGH COMPLEX 45 MIN: CPT

## 2018-11-03 PROCEDURE — 92610 EVALUATE SWALLOWING FUNCTION: CPT

## 2018-11-03 PROCEDURE — G8979 MOBILITY GOAL STATUS: HCPCS

## 2018-11-03 PROCEDURE — G8996 SWALLOW CURRENT STATUS: HCPCS

## 2018-11-03 PROCEDURE — 99232 SBSQ HOSP IP/OBS MODERATE 35: CPT | Performed by: INTERNAL MEDICINE

## 2018-11-03 PROCEDURE — 99232 SBSQ HOSP IP/OBS MODERATE 35: CPT | Performed by: PHYSICIAN ASSISTANT

## 2018-11-03 PROCEDURE — 99222 1ST HOSP IP/OBS MODERATE 55: CPT | Performed by: INTERNAL MEDICINE

## 2018-11-03 PROCEDURE — 85025 COMPLETE CBC W/AUTO DIFF WBC: CPT | Performed by: INTERNAL MEDICINE

## 2018-11-03 PROCEDURE — G8997 SWALLOW GOAL STATUS: HCPCS

## 2018-11-03 RX ORDER — METOPROLOL SUCCINATE 50 MG/1
50 TABLET, EXTENDED RELEASE ORAL DAILY
COMMUNITY
End: 2018-11-10 | Stop reason: HOSPADM

## 2018-11-03 RX ORDER — WARFARIN SODIUM 5 MG/1
5 TABLET ORAL
Status: ON HOLD | COMMUNITY
End: 2018-11-10

## 2018-11-03 RX ORDER — LISINOPRIL 20 MG/1
20 TABLET ORAL 2 TIMES DAILY
COMMUNITY
End: 2018-11-10 | Stop reason: HOSPADM

## 2018-11-03 RX ORDER — POLYETHYLENE GLYCOL 3350 17 G/17G
17 POWDER, FOR SOLUTION ORAL DAILY PRN
Status: DISCONTINUED | OUTPATIENT
Start: 2018-11-03 | End: 2018-11-09

## 2018-11-03 RX ORDER — ATORVASTATIN CALCIUM 40 MG/1
40 TABLET, FILM COATED ORAL
COMMUNITY

## 2018-11-03 RX ORDER — LISINOPRIL 20 MG/1
20 TABLET ORAL DAILY
Status: DISCONTINUED | OUTPATIENT
Start: 2018-11-04 | End: 2018-11-07

## 2018-11-03 RX ORDER — AMLODIPINE BESYLATE 10 MG/1
10 TABLET ORAL
COMMUNITY
End: 2018-11-10 | Stop reason: HOSPADM

## 2018-11-03 RX ORDER — PANTOPRAZOLE SODIUM 40 MG/1
40 TABLET, DELAYED RELEASE ORAL DAILY
COMMUNITY

## 2018-11-03 RX ORDER — CLONIDINE HYDROCHLORIDE 0.1 MG/1
0.1 TABLET ORAL EVERY 8 HOURS PRN
Status: DISCONTINUED | OUTPATIENT
Start: 2018-11-03 | End: 2018-11-09

## 2018-11-03 RX ORDER — DOCUSATE SODIUM 100 MG/1
100 CAPSULE, LIQUID FILLED ORAL 2 TIMES DAILY
Status: DISCONTINUED | OUTPATIENT
Start: 2018-11-03 | End: 2018-11-10 | Stop reason: HOSPADM

## 2018-11-03 RX ORDER — ACETAMINOPHEN 325 MG/1
650 TABLET ORAL EVERY 4 HOURS PRN
Status: ON HOLD | COMMUNITY
End: 2018-11-10

## 2018-11-03 RX ADMIN — ENOXAPARIN SODIUM 40 MG: 40 INJECTION SUBCUTANEOUS at 08:15

## 2018-11-03 RX ADMIN — ACETAMINOPHEN 650 MG: 325 TABLET, FILM COATED ORAL at 20:28

## 2018-11-03 RX ADMIN — LISINOPRIL 20 MG: 20 TABLET ORAL at 09:27

## 2018-11-03 RX ADMIN — PANTOPRAZOLE SODIUM 40 MG: 40 TABLET, DELAYED RELEASE ORAL at 06:23

## 2018-11-03 RX ADMIN — AMLODIPINE BESYLATE 5 MG: 5 TABLET ORAL at 08:14

## 2018-11-03 RX ADMIN — ATORVASTATIN CALCIUM 40 MG: 40 TABLET, FILM COATED ORAL at 16:54

## 2018-11-03 RX ADMIN — DOCUSATE SODIUM 100 MG: 100 CAPSULE, LIQUID FILLED ORAL at 16:53

## 2018-11-03 RX ADMIN — DOCUSATE SODIUM 100 MG: 100 CAPSULE, LIQUID FILLED ORAL at 13:21

## 2018-11-03 RX ADMIN — METOPROLOL SUCCINATE 50 MG: 50 TABLET, EXTENDED RELEASE ORAL at 08:14

## 2018-11-03 RX ADMIN — ACETAMINOPHEN 650 MG: 325 TABLET, FILM COATED ORAL at 08:15

## 2018-11-03 RX ADMIN — SODIUM CHLORIDE 75 ML/HR: 0.9 INJECTION, SOLUTION INTRAVENOUS at 04:52

## 2018-11-03 RX ADMIN — SODIUM CHLORIDE 75 ML/HR: 0.9 INJECTION, SOLUTION INTRAVENOUS at 17:01

## 2018-11-03 NOTE — PHYSICAL THERAPY NOTE
Physical Therapy Evaluation:    2 forms of pt ID verified:name,birthdate and pt ID bakari    Patient's Name: Chevis Palm    Admitting Diagnosis  Nasal mass [J34 9]    Problem List  Patient Active Problem List   Diagnosis    Nasopharyngeal mass    Orthostatic hypotension    New onset atrial flutter    Diabetes mellitus type 2    Essential hypertension    Recurrent falls    CAD (coronary artery disease)       Past Medical History  Past Medical History:   Diagnosis Date    Coronary artery disease     Diabetes mellitus ([address] )     Hemoptysis     Hypertension     Prostate cancer [facility])        Past Surgical History  Past Surgical History:   Procedure Laterality Date    BRONCHOSCOPY      CORONARY ANGIOPLASTY WITH STENT PLACEMENT      PROSTATECTOMY        11/03/18 1225   Note Type   Note type Eval only   Pain Assessment   Pain Assessment 0-10   Pain Score 4   Pain Type Acute pain   Pain Location Head   Pain Orientation Anterior;Bilateral   Hospital Pain Intervention(s) Repositioned; Ambulation/increased activity; Elevated; Emotional support; Environmental changes; Rest   Home Living   Type of [address] Two level  (pt reports caring for wife diagnosed with dementia)   Home Equipment Cane;Walker  ([facility])   Additional Comments pt reports caring for his wife who is diagnosed with dementia,use of personal DME PTA,1st floor setup,1-2 GARY,(+)fall history   Prior Function   Level of [place] Independent with ADLs and functional mobility  (per pt PTA)   Lives With Spouse; Family   Receives Help From Family   ADL Assistance Independent   IADLs Needs assistance  ((-)drive)   Falls in the last 6 months 1 to 4   Restrictions/Precautions   Other Precautions Pain; Fall Risk;Multiple lines;Telemetry; Chair Alarm;Hard of hearing   General   Additional Pertinent History nasal mass,dehydration,h/o falls   Family/Caregiver Present Yes  (daughter)   Cognition   Overall Cognitive Status Impaired Arousal/Participation Alert   Attention Attends with cues to redirect   Orientation Level Oriented to person;Oriented to place;Oriented to situation;Disoriented to time   Following Commands Follows one step commands with increased time or repetition  (2* inc pain,slow mobility and weakness,[place])   RLE Assessment   RLE Assessment (3+/5 grossly throughout at least)   LLE Assessment   LLE Assessment (3+/5 grossly throughout at least)   Coordination   Movements are Fluid and Coordinated 0   Coordination and Movement Description forward flexed posture,dec BLE step length,ataxic and unsteady gait pattern,slow mobility   Sensation WFL   Light Touch   RLE Light Touch Grossly intact   LLE Light Touch Grossly intact   Bed Mobility   Supine to Sit 3  Moderate assistance   Additional items Assist x 1;HOB elevated; Bedrails; Increased time required;Verbal cues;LE management   Transfers   Sit to Stand 3  Moderate assistance   Additional items Assist x 1;HOB elevated; Bedrails; Increased time required;Verbal cues   Stand to Sit 3  Moderate assistance   Additional items Assist x 1; Armrests; Increased time required;Verbal cues  (for safety,education and control descent)   Ambulation/Elevation   Gait pattern Poor UE support; Antalgic;Narrow BALJIT; Forward Flexion; Shuffling; Inconsistent yon; Foward flexed; Short stride; Ataxia; Step to;Excessively slow   Gait Assistance 4  Minimal assist   Additional items Assist x 1;Verbal cues; Tactile cues   Assistive Device Rolling walker   Distance 4 steps with use of RW on tile surface;limited mobility and gait distance 2* inc fatigue and weakness,unsteady   Balance   Static Sitting Good  (in chair postmobility with chair alarm intact)   Dynamic Sitting Poor   Static Standing Poor   Dynamic Standing Poor +   Ambulatory Poor +   Endurance Deficit   Endurance Deficit Yes   Endurance Deficit Description fatigue,weakness,pain   Activity Tolerance   Activity Tolerance Patient limited by fatigue;Patient limited by pain  (poor)   Nurse Made Aware yes   Assessment   Prognosis Guarded   Problem List Decreased strength;Decreased endurance; Impaired balance;Decreased mobility; Decreased coordination;Decreased cognition;Decreased safety awareness; Impaired hearing;Decreased skin integrity;Pain   Assessment Pt is a 79 y/o male admitted to [facility] 2* nasal mass and falls  Pt lives with wife and family in 1 story home and GARY  Pt reports caring for wife who is diagnosed with dementia,use of personal DME PTA,h/o falls,unable to get up from recent falls independently  Pt currently is not at functional mobility baseline,needs RW for mobility,Ax1 for mobility,multiple lines,IV medicine management,telemetry monitoring,inc pain,unsteady and ataxic movements and ongoing medical care  Pt demonstrates moderate deficits during functional mobility and gait including dec endurance,dec balance,dec BLE strength,unsteady and ataxic gait pattern and needs modAx1 for BM and transfers and minAx1 for gait with use of RW  Pt would cont to benefit from skilled inpt PT services to maximize functional independence  Barriers to Discharge Decreased caregiver support; Inaccessible home environment  (GARY)   Goals   Patient Goals to hear from the ENT doctor   STG Expiration Date 11/13/18   Short Term Goal #1 in 7-10 days:pt will be able to ambulate >100 feet with use of RW on various surfaces without LOB minAx1->S level of A to A pt to return to PLOF,activity tolerance:45mins/45mins,inc balance 1/2 grade to dec fall risk,inc BLE strength 1/2 grade to A pt to inc balance,strength,endurance and mobility,I with BLE ther ex HEP in various positions to A to inc balance,strength,mobility and endurance,BM and transfers S level of A to A pt to return to PLOF   Treatment Day 0   Plan   Treatment/Interventions Functional transfer training;LE strengthening/ROM; Therapeutic exercise; Endurance training;Patient/family training;Equipment eval/education; Bed mobility;Gait training;Spoke to nursing;Family   PT Frequency Other (Comment)  (3-5x/week)   Recommendation   Recommendation Other (Comment)  (inpt rhb recommended upon D/C)   Equipment Recommended Walker  (RW)   Barthel Index   Feeding 10   Bathing 0   Grooming Score 0   Dressing Score 5   Bladder Score 10   Bowels Score 10   Toilet Use Score 5   Transfers (Bed/Chair) Score 5   Mobility (Level Surface) Score 0   Stairs Score 0   Barthel Index Score 45   Skilled PT recommended while in hospital and upon DC to progress pt toward treatment goals           Kaila Feliz, PT, DPT@

## 2018-11-03 NOTE — CONSULTS
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   -can continue current medical therapy with amlodipine 5 mg daily, atorvastatin 40 mg daily, metoprolol succinate 50 mg daily  -orthostatic vital signs performed on 11/02/2018 were positive with a drop in systolic blood pressure of 148mmHg from lying position to 112mmHg and standing  -in the setting could consider decreasing lisinopril to 20 mg daily monitoring blood pressure however patient's blood pressure does appear labile with multiple readings with systolic blood pressure greater than 150 mmHg  -continue monitor heart rate  -follow up transthoracic echocardiogram  - once cleared by surgical standpoint would restart anticoagulation  Patient states that he will wishes to follow with his wife's cardiologist Dr Rudolph Madera in the outpatient setting  History of Present Illness   Physician Requesting Consult: Latanya Horton MD  Reason for Consult / Principal Problem:   Orthostatic hypotension and rate controlled atrial flutter with variable AV block  HPI: Rakan Couch is a 80y o  year old male past medical history significant for hypertension, hyperlipidemia, coronary artery disease S/P stenting in 2002, type 2 diabetes mellitus and history of nasopharyngeal mass who initially presented to [facility] after having recurrent falls    Patient had recently started on 40 mg of Lasix by his primary care physician for lower extremity edema and was noted to have positive orthostatics on presentation which date thought were related to acute dehydration from diuretic use  His diuretics were discontinued patient was hydrated with IV fluids with resolution of the hypotension prior to transfer to [facility] in [place] for further care and evaluation of the nasopharyngeal mass that was found to be extending into the skull base  Patient was seen and evaluated by Neurosurgery with plan for potential biopsy to evaluate the ENT  Currently at this time the patient notes some dizziness but states that is much improved from his admission  He denies any chest pain, palpitations, shortness breath, nausea vomiting, abdominal pain  He states that his lower extremity edema has resolved despite being off of his oral diuretic therapy from the outpatient setting  Consults    Review of Systems   Constitutional: Negative for chills and fever  HENT: Positive for sinus pain and sinus pressure  Negative for trouble swallowing  Eyes: Negative for pain and itching  Respiratory: Negative for cough and shortness of breath  Cardiovascular: Positive for leg swelling  Negative for chest pain and palpitations  Gastrointestinal: Negative for constipation, nausea and vomiting  Genitourinary: Negative for dysuria  Musculoskeletal: Negative for neck pain and neck stiffness  Skin: Negative for rash  Neurological: Positive for dizziness and syncope  Negative for seizures  Psychiatric/Behavioral: Negative for agitation  All other systems reviewed and are negative        Historical Information   Past Medical History:   Diagnosis Date    Coronary artery disease     [facility]     Hemoptysis     Hypertension     Prostate cancer ([facility] )      Past Surgical History:   Procedure Laterality Date    BRONCHOSCOPY      CORONARY ANGIOPLASTY WITH STENT PLACEMENT      PROSTATECTOMY       History   Alcohol Use No     History   Drug Use No     History   Smoking Status    Former Smoker    Packs/day: 1 50    Years: 40 00    Quit date: 12/31/2001   Smokeless Tobacco    Never Used     Family History:   Family History   Problem Relation Age of Onset    Heart disease Father        Meds/Allergies   all current active meds have been reviewed  Allergies   Allergen Reactions    Plavix [Clopidogrel] Rash       Objective   Vitals: Blood pressure 150/70, pulse 78, temperature 98 2 °F (36 8 °C), temperature source Oral, resp  rate 20, height 6' 1" (1 854 m), weight 88 5 kg (195 lb), SpO2 97 %  Orthostatic Blood Pressures      Most Recent Value   Blood Pressure  150/70 filed at 11/03/2018 0735   Patient Position - Orthostatic VS  Standing - Orthostatic VS filed at 11/02/2018 1155            Intake/Output Summary (Last 24 hours) at 11/03/18 0955  Last data filed at 11/03/18 7844   Gross per 24 hour   Intake             1430 ml   Output             2000 ml   Net             -570 ml       Invasive Devices     Peripheral Intravenous Line            Peripheral IV 11/02/18 Right Forearm less than 1 day                Physical Exam   Constitutional: No distress  HENT:   Head: Normocephalic and atraumatic  Eyes: Conjunctivae are normal  No scleral icterus  Neck: No JVD present  No tracheal deviation present  Cardiovascular:   Irregular rate and rhythm   Pulmonary/Chest: Effort normal and breath sounds normal  No respiratory distress  He has no wheezes  Abdominal: Soft  Bowel sounds are normal  There is no tenderness  Musculoskeletal: He exhibits no edema  Lump on right lower extremity   Neurological:   Awake, alert, able answer questions appropriately   Skin: Skin is warm and dry  He is not diaphoretic  Psychiatric: He has a normal mood and affect  Lab Results: I have personally reviewed pertinent lab results  Imaging: I have personally reviewed pertinent reports      EKG:   Rate controlled atrial flutter with variable AV block    Code Status: Level 1 - Full Code  Advance Directive and Living Will:      Power of :    POLST:

## 2018-11-03 NOTE — PLAN OF CARE
Problem: PHYSICAL THERAPY ADULT  Goal: Performs mobility at highest level of function for planned discharge setting  See evaluation for individualized goals  Treatment/Interventions: Functional transfer training, LE strengthening/ROM, Therapeutic exercise, Endurance training, Patient/family training, Equipment eval/education, Bed mobility, Gait training, Spoke to nursing, Family  Equipment Recommended: Melvin Wilhelm (TINY)       See flowsheet documentation for full assessment, interventions and recommendations  Prognosis: Guarded  Problem List: Decreased strength, Decreased endurance, Impaired balance, Decreased mobility, Decreased coordination, Decreased cognition, Decreased safety awareness, Impaired hearing, Decreased skin integrity, Pain  Assessment: Pt is a 79 y/o male admitted to [facility] 2* nasal mass and falls  Pt lives with wife and family in 1 story home and GARY  Pt reports caring for wife who is diagnosed with dementia,use of personal DME PTA,h/o falls,unable to get up from recent falls independently  Pt currently is not at functional mobility baseline,needs RW for mobility,Ax1 for mobility,multiple lines,IV medicine management,telemetry monitoring,inc pain,unsteady and ataxic movements and ongoing medical care  Pt demonstrates moderate deficits during functional mobility and gait including dec endurance,dec balance,dec BLE strength,unsteady and ataxic gait pattern and needs modAx1 for BM and transfers and minAx1 for gait with use of RW  Pt would cont to benefit from skilled inpt PT services to maximize functional independence  Barriers to Discharge: Decreased caregiver support, Inaccessible home environment (GARY)     Recommendation: Other (Comment) (inpt rhb recommended upon D/C)          See flowsheet documentation for full assessment

## 2018-11-03 NOTE — PROGRESS NOTES
[facility] Hospitalist Service - Internal Medicine Progress Note       PATIENT INFORMATION      Patient: Rosita Deleon 80 y o  male   MRN: [id number]  PCP: Nilam Arriola DO  [facility] Encounter: [id number]  Date Of Visit: 11/03/18       ASSESSMENTS & PLAN     Nasopharyngeal mass   Assessment & Plan    - transferred from [facility] for nasopharyngeal/skull base mass measuring 6 9 x 5 5 cm on CT imaging which previously measured 3 3 x 2 8 cm in July 2016  - would ideally need biopsy for definitive diagnosis and treatment plan - appreciate neurosurgery input recommending ENT consult to proceed with biopsy  - PRN pain control and supportive care otherwise   - PRN nasal spray for congestion      Orthostatic hypotension   Assessment & Plan    - prior to transfer, etiology was presumed to be recent Lasix use for lower extremity edema coupled with mild dehydration  - continue to hold Lasix - maintain on IV fluid hydration - orthostatics remain positive today - continue to serially monitor  - cardiology input appreciated - decreased Zestril to 20 mg daily   - awaiting echocardiogram especially in light of new onset atrial flutter     New onset atrial flutter   Assessment & Plan    - EKGs noted - remains rate-controlled on Toprol-XL  - Coumadin held anticipating neurosurgical intervention/biopsy   - cardiology following     Essential hypertension   Assessment & Plan    - continue Zestril/Norvasc/Toprol-XL     CAD (coronary artery disease)   Assessment & Plan    - status post prior stenting  - continue Lipitor/Zestril/Toprol-XL      Diabetes mellitus type 2   Assessment & Plan    - HbA1c well controlled at 5 8   - on SSI coverage per Accu-Cheks       VTE Prophylaxis:  Lovenox       SUBJECTIVE     Seen/examined this afternoon with nursing  No acute overnight events  States his dizziness has somewhat improved today but still feels generally weak/fatigued    Denies any headaches or trouble swallowing at this time  Nasal congestion also improving  OBJECTIVE     Vitals:   Temp (24hrs), Av 3 °F (36 8 °C), Min:98 2 °F (36 8 °C), Max:98 42 °F (36 9 °C)    Temp:  [98 2 °F (36 8 °C)-98 42 °F (36 9 °C)] 98 2 °F (36 8 °C)  HR:  [46-79] 71  Resp:  [20] 20  BP: (103-160)/(53-70) 103/53  SpO2:  [84 %-100 %] 97 %  Body mass index is 25 73 kg/m²  Input and Output Summary (last 24 hours):        Intake/Output Summary (Last 24 hours) at 18 1611  Last data filed at 18 1450   Gross per 24 hour   Intake           2297 5 ml   Output             1725 ml   Net            572 5 ml       Physical Exam:     GENERAL:  Well-developed/nourished - no acute distress  HEAD:  Normocephalic - atraumatic   EYES: PERRL - EOMI   MOUTH:  Mucosa moist  NECK:  Supple - full range of motion  CARDIAC:  Regular rate/rhythm - S1/S2 positive  PULMONARY:  Clear breath sounds bilaterally - nonlabored respirations  ABDOMEN:  Soft - nontender/nondistended - active bowel sounds  MUSCULOSKELETAL:  Motor strength/range of motion deconditioned  NEUROLOGIC:  Alert/oriented   SKIN:  Chronic wrinkles/blemishes   PSYCHIATRIC:  Mood/affect age-appropriate      ADDITIONAL DATA       Labs & Recent Cultures:       Results from last 7 days  Lab Units 18  0552   WBC Thousand/uL 5 58   HEMOGLOBIN g/dL 11 1*   HEMATOCRIT % 36 0*   PLATELETS Thousands/uL 100*   NEUTROS PCT % 70   LYMPHS PCT % 22   MONOS PCT % 7   EOS PCT % 1       Results from last 7 days  Lab Units 18  0600   POTASSIUM mmol/L 4 0   CHLORIDE mmol/L 105   CO2 mmol/L 26   BUN mg/dL 15   CREATININE mg/dL 1 09   CALCIUM mg/dL 8 4         Last 24 Hours Medication List:     Current Facility-Administered Medications:  acetaminophen 650 mg Oral Q6H PRN Ghislaine Deshpande MD    amLODIPine 5 mg Oral Daily Ghislaine Deshpande MD    atorvastatin 40 mg Oral Daily With Belgica Fabian MD    docusate sodium 100 mg Oral BID Aniceto Powell MD    enoxaparin 40 mg Subcutaneous Daily Radu Che MD    insulin lispro 1-5 Units Subcutaneous HS Radu Che MD    insulin lispro 1-6 Units Subcutaneous TID AC MD Maxim Mackay ON 11/4/2018] lisinopril 20 mg Oral Daily Lois Ayoub MD    metoprolol succinate 50 mg Oral Daily Radu Che MD    pantoprazole 40 mg Oral Early Morning Radu Che MD    polyethylene glycol 17 g Oral Daily PRN Romelia Wahl MD    sodium chloride 1 spray Each Nare Q4H PRN Romelia Wahl MD    sodium chloride 75 mL/hr Intravenous Continuous Romelia Wahl MD Last Rate: 75 mL/hr (11/03/18 0452)          Time Spent for Care: 33 minutes  More than 50% of total time spent on counseling and coordination of care as described above  Current Length of Stay: 2 day(s)      Code Status: Level 1 - Full Code         ** Please Note: This note is constructed using a voice recognition dictation system   **

## 2018-11-03 NOTE — SPEECH THERAPY NOTE
Speech Language/Pathology  Speech/Language Pathology  Assessment    Patient Name: Ishaan Liu  [id number] Date: 11/3/2018     Problem List  Patient Active Problem List   Diagnosis    Nasopharyngeal mass    Orthostatic hypotension    New onset atrial flutter    Diabetes mellitus type 2    Essential hypertension    Recurrent falls    CAD (coronary artery disease)     Past Medical History  Past Medical History:   Diagnosis Date    Coronary artery disease     Diabetes mellitus ([address] )     Hemoptysis     Hypertension     Prostate cancer [facility])      Past Surgical History  Past Surgical History:   Procedure Laterality Date    BRONCHOSCOPY      CORONARY ANGIOPLASTY WITH STENT PLACEMENT      PROSTATECTOMY       Ishaan Liu is a 80 y o  male who presents as a transfer from [facility] with 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  The patient had presented to [facility] on 10/29/18 with recurrent falls  He recently was started on Lasix 40 mg b i d  by his primary care physician for lower extremity edema  He was noted to be orthostatic on presentation acute related to dehydration from diuretic use  Diuretics have since been discontinued  Patient was hydrated with IV fluids and had resolution of orthostatic hypotension prior to transfer  Evaluation which involved imaging studies of his head revealed a large nasopharyngeal mass for which patient was transferred to [facility] for neurosurgical evaluation with Dr Jagjit Angelo  He reports specifically left nasal congestion over the past 6 weeks with transient relief when he uses Afrin  He also reports early morning headache relieved with Tylenol  An MRI of the brain done at [place] showed a large left-sided nasopharyngeal/skull base mass measuring 6 9 x 5 5 cm which had grown in size since 2016 when it was 3 3 x 2 8 cm    Patient reports he had been evaluated 2 years ago at [facility] and at that time no intervention was recommended  Bedside Swallow Evaluation:    Summary:  Pt presents w/ suspected functional oropharyngeal swallow skills for regular diet c thin liquids based on reports from pt/nursing and minimal bedside assessment  Pt reported he did not want to place dentures for PO intake during assessment  Pt attempted soft/hard solids but removed them partially masticated  Pt tolerated thin juice via straw sips without difficulties  RN reported pt tolerated a sandwich for lunch without difficulties  Of note, during oral mech exam, pt's hard/soft palate appear to be asymmetrical with the left side coming down further into the oral cavity and pt's uvula does not appear centered but located more on the right side  Suspect these asymmetries/shifts from midline to be related to nasopharyngeal mass  Pt's voice is full c some hypernasality component  Pt's speech is also noted to be mildly dysarthric, suspect due to mass  Pt demonstrated absence of airflow from the left nostril but closing on the right and attempting to blow c no air coming out of left nostril  Pt ok to remain on regular diet at this time, downgrade to level 1 puree if pt starts to present c oropharyngeal dysphagia c solid foods  Recommendations:  Diet: Regular  Liquid: thin  Meds: as tolerated  Supervision: assist as needed  Positioning:Upright  Strategies: Pt to take PO/Meds only when fully alert and upright  Oral care: encourage twice daily cleaning  Aspiration precautions    Therapy Prognosis: fair  Prognosis considerations: comorbidities  Frequency: 2-3x/week    Consider consult w/:  ENT  Nutrition      Reason for consult:  R/o aspiration  Determine safest and least restrictive diet    Current diet:  Reg/thin  Premorbid diet[de-identified]  Reg/thin  Previous VBS:  No  O2 requirement:  RA  Voice/Speech:  Hoarse/full/dysarthric  Follows commands:   Yes                        Cognitive Status:  Intact  Oral mech exam:  Edentulous  Upper plate  Lower plate  Full symmetry  asymmetrical roof of mouth  Items administered:  soft solid, thin liquids  Liquids were taken by straw  Oral stage:  Lip closure: adequate  Mastication: n/a  Bolus formation: adequate  Bolus control: adequate  Transfer: prompt  Oral residue: no  Pocketing: no    Pharyngeal stage:  Swallow promptness: appeared prompt  Laryngeal rise: adequate  Wet voice: no  Throat clear: no  Cough: no  Secondary swallows: no  Audible swallows: no  No s/s aspiration    Esophageal stage:  No s/s reported    Results d/w:  Pt, nursing, physician    Goal(s):  Pt will tolerate least restrictive diet w/out s/s aspiration or oral/pharyngeal difficulties

## 2018-11-04 ENCOUNTER — APPOINTMENT (INPATIENT)
Dept: NON INVASIVE DIAGNOSTICS | Facility: HOSPITAL | Age: 83
DRG: 155 | End: 2018-11-04
Payer: COMMERCIAL

## 2018-11-04 LAB
ANION GAP SERPL CALCULATED.3IONS-SCNC: 4 MMOL/L (ref 4–13)
BASOPHILS # BLD AUTO: 0.02 THOUSANDS/ΜL (ref 0–0.1)
BASOPHILS NFR BLD AUTO: 1 % (ref 0–1)
BUN SERPL-MCNC: 14 MG/DL (ref 5–25)
CALCIUM SERPL-MCNC: 8.2 MG/DL (ref 8.3–10.1)
CHLORIDE SERPL-SCNC: 104 MMOL/L (ref 100–108)
CO2 SERPL-SCNC: 27 MMOL/L (ref 21–32)
CREAT SERPL-MCNC: 0.88 MG/DL (ref 0.6–1.3)
EOSINOPHIL # BLD AUTO: 0.03 THOUSAND/ΜL (ref 0–0.61)
EOSINOPHIL NFR BLD AUTO: 1 % (ref 0–6)
ERYTHROCYTE [DISTWIDTH] IN BLOOD BY AUTOMATED COUNT: 16.1 % (ref 11.6–15.1)
GFR SERPL CREATININE-BSD FRML MDRD: 79 ML/MIN/1.73SQ M
GLUCOSE SERPL-MCNC: 111 MG/DL (ref 65–140)
GLUCOSE SERPL-MCNC: 113 MG/DL (ref 65–140)
GLUCOSE SERPL-MCNC: 125 MG/DL (ref 65–140)
GLUCOSE SERPL-MCNC: 132 MG/DL (ref 65–140)
GLUCOSE SERPL-MCNC: 136 MG/DL (ref 65–140)
HCT VFR BLD AUTO: 35.9 % (ref 36.5–49.3)
HGB BLD-MCNC: 11.1 G/DL (ref 12–17)
IMM GRANULOCYTES # BLD AUTO: 0.01 THOUSAND/UL (ref 0–0.2)
IMM GRANULOCYTES NFR BLD AUTO: 0 % (ref 0–2)
INR PPP: 1.2 (ref 0.86–1.17)
LYMPHOCYTES # BLD AUTO: 1.06 THOUSANDS/ΜL (ref 0.6–4.47)
LYMPHOCYTES NFR BLD AUTO: 24 % (ref 14–44)
MCH RBC QN AUTO: 25.3 PG (ref 26.8–34.3)
MCHC RBC AUTO-ENTMCNC: 30.9 G/DL (ref 31.4–37.4)
MCV RBC AUTO: 82 FL (ref 82–98)
MONOCYTES # BLD AUTO: 0.28 THOUSAND/ΜL (ref 0.17–1.22)
MONOCYTES NFR BLD AUTO: 6 % (ref 4–12)
NEUTROPHILS # BLD AUTO: 2.99 THOUSANDS/ΜL (ref 1.85–7.62)
NEUTS SEG NFR BLD AUTO: 68 % (ref 43–75)
NRBC BLD AUTO-RTO: 0 /100 WBCS
PLATELET # BLD AUTO: 96 THOUSANDS/UL (ref 149–390)
PMV BLD AUTO: 10.7 FL (ref 8.9–12.7)
POTASSIUM SERPL-SCNC: 3.7 MMOL/L (ref 3.5–5.3)
PROTHROMBIN TIME: 15.3 SECONDS (ref 11.8–14.2)
RBC # BLD AUTO: 4.38 MILLION/UL (ref 3.88–5.62)
SODIUM SERPL-SCNC: 135 MMOL/L (ref 136–145)
WBC # BLD AUTO: 4.39 THOUSAND/UL (ref 4.31–10.16)

## 2018-11-04 PROCEDURE — 80048 BASIC METABOLIC PNL TOTAL CA: CPT | Performed by: INTERNAL MEDICINE

## 2018-11-04 PROCEDURE — 93306 TTE W/DOPPLER COMPLETE: CPT | Performed by: INTERNAL MEDICINE

## 2018-11-04 PROCEDURE — 99232 SBSQ HOSP IP/OBS MODERATE 35: CPT | Performed by: INTERNAL MEDICINE

## 2018-11-04 PROCEDURE — 82948 REAGENT STRIP/BLOOD GLUCOSE: CPT

## 2018-11-04 PROCEDURE — 99232 SBSQ HOSP IP/OBS MODERATE 35: CPT | Performed by: PHYSICIAN ASSISTANT

## 2018-11-04 PROCEDURE — 93306 TTE W/DOPPLER COMPLETE: CPT

## 2018-11-04 PROCEDURE — 85025 COMPLETE CBC W/AUTO DIFF WBC: CPT | Performed by: INTERNAL MEDICINE

## 2018-11-04 PROCEDURE — 85610 PROTHROMBIN TIME: CPT | Performed by: INTERNAL MEDICINE

## 2018-11-04 RX ORDER — WARFARIN SODIUM 5 MG/1
5 TABLET ORAL
Status: DISCONTINUED | OUTPATIENT
Start: 2018-11-04 | End: 2018-11-04

## 2018-11-04 RX ORDER — ECHINACEA PURPUREA EXTRACT 125 MG
2 TABLET ORAL EVERY 4 HOURS PRN
Status: DISCONTINUED | OUTPATIENT
Start: 2018-11-04 | End: 2018-11-10 | Stop reason: HOSPADM

## 2018-11-04 RX ADMIN — ATORVASTATIN CALCIUM 40 MG: 40 TABLET, FILM COATED ORAL at 16:29

## 2018-11-04 RX ADMIN — DOCUSATE SODIUM 100 MG: 100 CAPSULE, LIQUID FILLED ORAL at 16:29

## 2018-11-04 RX ADMIN — SODIUM CHLORIDE 75 ML/HR: 0.9 INJECTION, SOLUTION INTRAVENOUS at 18:21

## 2018-11-04 RX ADMIN — DOCUSATE SODIUM 100 MG: 100 CAPSULE, LIQUID FILLED ORAL at 07:56

## 2018-11-04 RX ADMIN — AMLODIPINE BESYLATE 5 MG: 5 TABLET ORAL at 07:57

## 2018-11-04 RX ADMIN — SODIUM CHLORIDE 75 ML/HR: 0.9 INJECTION, SOLUTION INTRAVENOUS at 05:54

## 2018-11-04 RX ADMIN — METOPROLOL SUCCINATE 50 MG: 50 TABLET, EXTENDED RELEASE ORAL at 07:57

## 2018-11-04 RX ADMIN — ACETAMINOPHEN 650 MG: 325 TABLET, FILM COATED ORAL at 05:59

## 2018-11-04 RX ADMIN — ENOXAPARIN SODIUM 40 MG: 40 INJECTION SUBCUTANEOUS at 07:57

## 2018-11-04 RX ADMIN — PANTOPRAZOLE SODIUM 40 MG: 40 TABLET, DELAYED RELEASE ORAL at 05:53

## 2018-11-04 RX ADMIN — LISINOPRIL 20 MG: 20 TABLET ORAL at 08:00

## 2018-11-04 NOTE — PROGRESS NOTES
ENT    I had a lengthy conversation with the patient's family who were upset that a biopsy would not be performed as an inpatient  I did explain that the biopsy could likely be performed in the office under local anesthesia via the transnasal approach  I explained that there was added risk of anesthesia in this patient due to the mass effect from the tumor in the oropharynx  I also explained that biopsy was not an emergency for this admission  It is possible that the tumor encasement of the carotid artery could be contributing to carotid insufficiency  I recommend evaluating this possibility with a CT angiogram of the head neck  I also recommended that the patient follow up within the week after discharge to our office for biopsy  The family asked many questions and seemed to understand and accept the plan of care at the end of my discussion with them

## 2018-11-04 NOTE — PROGRESS NOTES
Patient's /79 and Pulse is 68  There are no parameters when to call the provider related to BPs  The patient is ordered orthostatic BP OD in the mornings  Message sent to [facility] provider to this effect

## 2018-11-04 NOTE — PROGRESS NOTES
[facility] Hospitalist Service - Internal Medicine Progress Note       PATIENT INFORMATION      Patient: Rakan Couch 80 y o  male   MRN: [id number]  PCP: Elio Back DO  Unit/Bed#: [facility] 620-01 Encounter: [id number]  Date Of Visit: 11/04/18       ASSESSMENTS & PLAN     Nasopharyngeal mass   Assessment & Plan    - transferred from [facility] for nasopharyngeal/skull base mass measuring 6 9 x 5 5 cm on CT imaging which previously measured 3 3 x 2 8 cm in July 2016  - would ideally need biopsy for definitive diagnosis and treatment plan - appreciate neurosurgery input who recommended ENT consult to proceed with biopsy - discussed with ENT today after their discussion with family and all are in agreement to pursue transnasal approach biopsy in the outpatient office approximately one week post-discharge  - PRN pain control and supportive care otherwise    - PRN nasal spray for congestion    - speech/swallow evaluation appreciated - can tolerate regular diet with thin liquids     Orthostatic hypotension   Assessment & Plan    - prior to transfer, etiology was presumed to be recent Lasix use for lower extremity edema coupled with mild dehydration  - discontinued Lasix - maintain IV fluid hydration - serial orthostatic checks   - cardiology input appreciated who are okay w/ higher baseline BP readings to offset positional drops   - check CT angiogram of head/neck to assess for carotid insufficiency in postural dizziness (?? mass compression)  - echocardiogram reveals an EF of 55% with trace MR/TR but no evidence of LV wall motion abnormalities per reading cardiologist     New onset atrial flutter   Assessment & Plan    - EKGs noted - currently rate controlled on Toprol-XL  - Coumadin will remain held anticipating neurosurgical intervention/biopsy within a week post discharge     Essential hypertension   Assessment & Plan    - continue Zestril/Norvasc/Toprol-XL  - as stated above, cardiology is okay with slightly higher baseline resting BP to offset positional drop from orthostasis     CAD (coronary artery disease)   Assessment & Plan    - status post prior stenting  - continue Lipitor/Zestril/Toprol-XL      Diabetes mellitus type 2   Assessment & Plan    - HbA1c well controlled at 5 8   - on SSI coverage per Accu-Cheks       VTE Prophylaxis:  Lovenox      SUBJECTIVE     Seen/examined earlier in the day  No acute overnight events  He states his dizziness has improved with IV fluids but still intermittently persists  Denies any headaches at this time  Denies any visual changes or swallowing difficulty in the last 24 hours  Remains in hopeful spirits  OBJECTIVE     Vitals:   Temp (24hrs), Av °F (36 7 °C), Min:97 7 °F (36 5 °C), Max:98 24 °F (36 8 °C)    Temp:  [97 7 °F (36 5 °C)-98 24 °F (36 8 °C)] 97 7 °F (36 5 °C)  HR:  [46-81] 61  Resp:  [19-20] 20  BP: (109-178)/(61-84) 126/64  SpO2:  [93 %-100 %] 100 %  Body mass index is 25 73 kg/m²  Input and Output Summary (last 24 hours):        Intake/Output Summary (Last 24 hours) at 18 1629  Last data filed at 18 1450   Gross per 24 hour   Intake           2217 5 ml   Output             1750 ml   Net            467 5 ml       Physical Exam:     GENERAL:  Well-developed/nourished - no immediate distress  HEAD:  Normocephalic - atraumatic - no sinus tenderness noted  EYES: PERRL - EOMI   MOUTH:  Mucosa moist  NECK:  Supple - full range of motion  CARDIAC:  Regular rate/rhythm - S1/S2 positive  PULMONARY:  Clear  to auscultation bilaterally - nonlabored respirations  ABDOMEN:  Soft - nontender/nondistended - active bowel sounds  MUSCULOSKELETAL:  Motor strength/range of motion somewhat deconditioned  NEUROLOGIC:  Alert/oriented - no nystagmus   SKIN:  Chronic wrinkles/blemishes   PSYCHIATRIC:  Mood/affect stable      ADDITIONAL DATA       Labs & Recent Cultures:       Results from last 7 days  Lab Units 18  0546   WBC Thousand/uL 4 39   HEMOGLOBIN g/dL 11 1*   HEMATOCRIT % 35 9*   PLATELETS Thousands/uL 96*   NEUTROS PCT % 68   LYMPHS PCT % 24   MONOS PCT % 6   EOS PCT % 1       Results from last 7 days  Lab Units 11/04/18  0546   POTASSIUM mmol/L 3 7   CHLORIDE mmol/L 104   CO2 mmol/L 27   BUN mg/dL 14   CREATININE mg/dL 0 88   CALCIUM mg/dL 8 2*       Results from last 7 days  Lab Units 11/04/18  1237   INR  1 20*         Last 24 Hours Medication List:     Current Facility-Administered Medications:  acetaminophen 650 mg Oral Q6H PRN Aubrey Latham MD    amLODIPine 5 mg Oral Daily Aubrey Latham MD    atorvastatin 40 mg Oral Daily With Franco Alford MD    cloNIDine 0 1 mg Oral Q8H PRN Michel Copeland PA-C    docusate sodium 100 mg Oral BID MD José Rapp [place] ON 11/5/2018] enoxaparin 40 mg Subcutaneous Q24H [facility] Tonia Watts MD    insulin lispro 1-5 Units Subcutaneous HS Aubrey Latham MD    insulin lispro 1-6 Units Subcutaneous TID AC Tonia Watts MD    lisinopril 20 mg Oral Daily Phyllistine MD Malena    metoprolol succinate 50 mg Oral Daily Aubrey Latham MD    pantoprazole 40 mg Oral Early Morning Aubrey Latham MD    polyethylene glycol 17 g Oral Daily PRN Tonia Watts MD    sodium chloride 1 spray Each Nare Q4H PRN Tonia Watts MD    sodium chloride 75 mL/hr Intravenous Continuous Tonia Watts MD Last Rate: 75 mL/hr (11/04/18 0554)          Time Spent for Care: 33 minutes  More than 50% of total time spent on counseling and coordination of care as described above  Current Length of Stay: 3 day(s)      Code Status: Level 1 - Full Code         ** Please Note: This note is constructed using a voice recognition dictation system   **

## 2018-11-04 NOTE — PROGRESS NOTES
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  2  HTN  3  New onset a-fib  4  Orthostatic hyptension  5  Recurrent falls  6  Diabetes mellitus type II         Plan:  -speech consultation appreciated  -ears nose and throat for biopsy of mass    Subjective/Objective     Not feeling well this morning  No specific complaints        Intake/Output       11/04/18 0701 - 11/05/18 0700      [id number] 0408-2111 Total       Intake    P O   120  -- 120    Total Intake 120 -- 120       Output    Urine  275  -- 275    Urine 275 -- 275    Unmeasured Urine Occurrence 1 x -- 1 x    Total Output 275 -- 275       Net I/O     -155 -- -155          Invasive Devices     Peripheral Intravenous Line            Peripheral IV 11/02/18 Right Forearm 1 day                Physical Exam:    Vitals: Blood pressure (!) 178/84, pulse 73, temperature 98 °F (36 7 °C), temperature source Oral, resp  rate 20, height 6' 1" (1 854 m), weight 88 5 kg (195 lb), SpO2 94 %  ,Body mass index is 25 73 kg/m²  Awake and alert  Sitting out the chair  Moves all extremities  Difficulty clearing his secretions  The soft palate repair rises asymmetrically on the left  Lungs are coarse  Heart regular rate  Abdomen soft        Lab Results: I have personally reviewed pertinent results  Imaging Studies: I have personally reviewed pertinent reports          VTE Pharmacologic Prophylaxis: Warfarin (Coumadin) held    VTE Mechanical Prophylaxis: sequential compression device

## 2018-11-04 NOTE — PROGRESS NOTES
Patient's oxygen saturation  Dropped to mid to high 80's  Writer observed what looked like patient holding his breath  Oxygen 2 liters applied   Saturations back up to 97 %

## 2018-11-04 NOTE — CONSULTS
Otolaryngology Head and Neck Surgery Consultation    Chief complaint  Nasopharynx mass      History of the Present Illness    Marah Conley is a 80 y  o  who presents with a large nasopharynx mass incidentally noted  He was admitted for control of atrial fibrillation and subsequent scans demonstrated the mass  He has known about this mass for more than 1 year but has not had any further workup  He has some associated dysarthria  Swallowing status unknown  Review of systems (symptoms negative, as below, unless superceded by positive findings in bold)    General: no weight loss/gain, no fatigue, no fevers/chills  Neurologic: no headache, tremors, seizures  Eyes: no diplopia, no vision changes  Ears/Nose/Throat: no hearing loss, nasal obstruction, hoarseness  Cardiovascular: no palpitations, chest pain  Respiratory : no shortness of breath, no wheezing  Gastrointestinal: no indigestion, heartburn, diarrhea, constipation  Genitourinary: no dysuria, no increased frequency  Musculoskeletal: no bone pain, muscle pain, joint pain  Psychologic: no depression, anxiety  Hematologic: no easy bleeding/bruising  Lymphatic: no swollen lymph nodes    Past Medical History:   Diagnosis Date    Coronary artery disease     Diabetes mellitus ([facility] )     Hemoptysis     Hypertension     Prostate cancer ([facility] )        Past Surgical History:   Procedure Laterality Date    BRONCHOSCOPY      CORONARY ANGIOPLASTY WITH STENT PLACEMENT      PROSTATECTOMY         Social History     Social History    Marital status: Unknown     Spouse name: N/A    Number of children: N/A    Years of education: N/A     Occupational History    Not on file       Social History Main Topics    Smoking status: Former Smoker     Packs/day: 1 50     Years: 40 00     Quit date: 12/31/2001    Smokeless tobacco: Never Used    Alcohol use No    Drug use: No    Sexual activity: Not on file     Other Topics Concern    Not on file     Social History Narrative    No narrative on file       Family history: non-contributory    Physical Examination    BP (!) 178/84   Pulse 73   Temp 98 °F (36 7 °C) (Oral)   Resp 20   Ht 6' 1" (1 854 m)   Wt 88 5 kg (195 lb)   SpO2 94%   BMI 25 73 kg/m²   Constitutional:  Well developed, well nourished and groomed, in no acute distress  Eyes:  Extra-ocular movements intact, pupils equally round and reactive to light and accommodation, the lids and conjunctivae are normal in appearance  HEENT:    Head: Atraumatic, normocephalic, no visible scalp lesions, bony palpation unremarkable without stepoffs, parotid and submandibular salivary glands non-tender to palpation and without masses bilaterally  Ears:  Auricles normal in appearance bilaterally, mastoid prominence non-tender,    Nose/Sinuses:  External appearance unremarkable, no maxillary or frontal sinus tenderness to palpation bilaterally, anterior rhinoscopy reveals normal appearing mucosa, without polyps or masses  Oral Cavity:  Moist mucus membranes, gums dentition unremarkable, no oral mucosal masses or lesions, floor of mouth soft, tongue mobile without masses or lesions  Oropharynx:  Extensive fullness along the posterior pharyngeal mucosa due to mass effect    Neck:  No visible or palpable cervical lesions or lymphadenopathy, thyroid gland is normal in size and symmetry and without masses, normal laryngeal elevation with swallowing  Cardiovascular:  Normal rate and rhythm, no palpable thrills, no jugulovenous distension observed  Respiratory:  Normal respiratory effort without evidence of retractions or use of accessory muscles  Integument:  Normal appearing without observed masses or lesions  Neurologic:  Cranial nerves II-XII intact bilaterally  Psychiatric:  Alert and oriented to time, place and person, normal affect      Flexible nasopharyngoscopy:      Imaging studies:  MR brain demonstrates a large pharyngeal mass with extension into the nasopharynx    Pertinent laboratory data:       Assessment  80 y  o  with nasopharyngeal and oropharyngeal mass    Plan  It is my impression that Yudith Walters has a large nasopharyngeal and oropharyngeal mass  This may be lymphoma  It may be amenable to transnasal nasopharyngeal biopsy  This may be completed in the office or in the operating room  I recommended follow-up in my office as an outpatient for further assessment and possible biopsy at that time  Please have him follow-up in the office within 1 week of discharge  I also recommend a speech and swallow evaluation to determine if there is any compromise to his or pharyngeal swallow due to mass effect

## 2018-11-04 NOTE — PROGRESS NOTES
Cardiology Progress Note - Loretta Gee 80 y o  male MRN: [id number]    Unit/Bed#: [facility] 620-01 Encounter: [id number]        Subjective:    No significant events overnight  Still dizzy  Review of Systems   Cardiovascular: Negative for chest pain, leg swelling and palpitations  Respiratory: Negative for shortness of breath  Neurological: Positive for dizziness  Objective:   Vitals: Blood pressure 154/72, pulse 72, temperature 98 °F (36 7 °C), temperature source Oral, resp  rate 20, height 6' 1" (1 854 m), weight 88 5 kg (195 lb), SpO2 99 %  , Body mass index is 25 73 kg/m² , Orthostatic Blood Pressures      Most Recent Value   Blood Pressure  154/72 filed at 11/04/2018 1134   Patient Position - Orthostatic VS  Standing - Orthostatic VS filed at 11/03/2018 6669         Systolic (62ZPU), TEB:272 , Min:103 , NSS:260     Diastolic (78ZCW), APQ:23, Min:53, Max:84      Intake/Output Summary (Last 24 hours) at 11/04/18 1307  Last data filed at 11/04/18 0911   Gross per 24 hour   Intake             2295 ml   Output             1800 ml   Net              495 ml     Weight (last 2 days)     None          Telemetry Review: Off    Physical Exam   Cardiovascular: Normal rate, regular rhythm and normal heart sounds  Exam reveals no gallop and no friction rub  No murmur heard  Pulmonary/Chest: Breath sounds normal  He has no wheezes  He has no rales  Musculoskeletal: He exhibits no edema           Laboratory Results:        CBC with diff:   Results from last 7 days  Lab Units 11/04/18  0546 11/03/18  0552 11/02/18  0600   WBC Thousand/uL 4 39 5 58 6 69   HEMOGLOBIN g/dL 11 1* 11 1* 10 5*   HEMATOCRIT % 35 9* 36 0* 33 8*   MCV fL 82 83 82   PLATELETS Thousands/uL 96* 100* 102*   MCH pg 25 3* 25 6* 25 5*   MCHC g/dL 30 9* 30 8* 31 1*   RDW % 16 1* 16 3* 16 6*   MPV fL 10 7 10 9 11 8   NRBC AUTO /100 WBCs 0 0  --          CMP:  Results from last 7 days  Lab Units 11/04/18  0546 11/02/18  0600   POTASSIUM mmol/L 3 7 4  0   CHLORIDE mmol/L 104 105   CO2 mmol/L 27 26   BUN mg/dL 14 15   CREATININE mg/dL 0 88 1 09   CALCIUM mg/dL 8 2* 8 4   EGFR ml/min/1 73sq m 79 62         BMP:  Results from last 7 days  Lab Units 11/04/18  0546 11/02/18  0600   POTASSIUM mmol/L 3 7 4 0   CHLORIDE mmol/L 104 105   CO2 mmol/L 27 26   BUN mg/dL 14 15   CREATININE mg/dL 0 88 1 09   CALCIUM mg/dL 8 2* 8 4     Magnesium:       Coags:   Results from last 7 days  Lab Units 11/04/18  1237   INR  1 20*         Meds/Allergies     Current Facility-Administered Medications:  acetaminophen 650 mg Oral Q6H PRN Chemo Levy MD    amLODIPine 5 mg Oral Daily Chemo [facility] MD    atorvastatin 40 mg Oral Daily With Brenda Moran MD    cloNIDine 0 1 mg Oral Q8H PRN Myrtle Adkins PA-C    docusate sodium 100 mg Oral BID Sherine Trejo MD    enoxaparin 1 mg/kg Subcutaneous Q12H [address] Sherine Trejo MD    insulin lispro 1-5 Units Subcutaneous HS Chemo [facility] MD    insulin lispro 1-6 Units Subcutaneous TID AC Sherine Trejo MD    lisinopril 20 mg Oral Daily Valentín Munoz MD    metoprolol succinate 50 mg Oral Daily Chemo [facility] MD    pantoprazole 40 mg Oral Early Morning Chemo Levy MD    polyethylene glycol 17 g Oral Daily PRN Sherine Trejo MD    sodium chloride 1 spray Each Nare Q4H PRN Sherine Trejo MD    sodium chloride 75 mL/hr Intravenous Continuous Sherine Trejo MD Last Rate: 75 mL/hr (11/04/18 0554)   warfarin 5 mg Oral Daily (warfarin) Sherine Trejo MD        sodium chloride 75 mL/hr Last Rate: 75 mL/hr (11/04/18 0554)     Prescriptions Prior to Admission   Medication    acetaminophen (TYLENOL) 325 mg tablet    amLODIPine (NORVASC) 10 mg tablet    atorvastatin (LIPITOR) 40 mg tablet    insulin lispro (HumaLOG) 100 units/mL injection    insulin lispro (HumaLOG) 100 units/mL injection    lisinopril (ZESTRIL) 20 mg tablet    metoprolol succinate (TOPROL-XL) 50 mg 24 hr tablet    pantoprazole (PROTONIX) 40 mg tablet    warfarin (COUMADIN) 5 mg tablet       Assessment:  Principal Problem:    Nasopharyngeal mass  Active Problems:    Orthostatic hypotension    New onset atrial flutter    Diabetes mellitus type 2    Essential hypertension    Recurrent falls    CAD (coronary artery disease)      Impression:  1  Orthostatic hypotension -may be 2nd to nasopharyngeal mass  Would tolerate higher blood pressures at baseline (i e  160's-170's) than hypotension for now  Also, would keep hydrated  2  Nasopharyngeal mass - awaiting w/u   3  Atrial flutter - rate controlled  Holding anticoagulation at this time  Would consider biopsying mass this hospitalization, so no need to start and stop warfarin again      Recommendations:  1  Continue current medications  2  Await w/u for nasopharyngeal mass

## 2018-11-05 ENCOUNTER — APPOINTMENT (INPATIENT)
Dept: RADIOLOGY | Facility: HOSPITAL | Age: 83
DRG: 155 | End: 2018-11-05
Payer: COMMERCIAL

## 2018-11-05 LAB
BASOPHILS # BLD AUTO: 0.02 THOUSANDS/ΜL (ref 0–0.1)
BASOPHILS NFR BLD AUTO: 0 % (ref 0–1)
EOSINOPHIL # BLD AUTO: 0.03 THOUSAND/ΜL (ref 0–0.61)
EOSINOPHIL NFR BLD AUTO: 1 % (ref 0–6)
ERYTHROCYTE [DISTWIDTH] IN BLOOD BY AUTOMATED COUNT: 16 % (ref 11.6–15.1)
GLUCOSE SERPL-MCNC: 109 MG/DL (ref 65–140)
GLUCOSE SERPL-MCNC: 111 MG/DL (ref 65–140)
GLUCOSE SERPL-MCNC: 122 MG/DL (ref 65–140)
GLUCOSE SERPL-MCNC: 128 MG/DL (ref 65–140)
HCT VFR BLD AUTO: 35.6 % (ref 36.5–49.3)
HGB BLD-MCNC: 11 G/DL (ref 12–17)
IMM GRANULOCYTES # BLD AUTO: 0.01 THOUSAND/UL (ref 0–0.2)
IMM GRANULOCYTES NFR BLD AUTO: 0 % (ref 0–2)
INR PPP: 1.24 (ref 0.86–1.17)
LYMPHOCYTES # BLD AUTO: 1.2 THOUSANDS/ΜL (ref 0.6–4.47)
LYMPHOCYTES NFR BLD AUTO: 23 % (ref 14–44)
MCH RBC QN AUTO: 25.4 PG (ref 26.8–34.3)
MCHC RBC AUTO-ENTMCNC: 30.9 G/DL (ref 31.4–37.4)
MCV RBC AUTO: 82 FL (ref 82–98)
MONOCYTES # BLD AUTO: 0.39 THOUSAND/ΜL (ref 0.17–1.22)
MONOCYTES NFR BLD AUTO: 7 % (ref 4–12)
NEUTROPHILS # BLD AUTO: 3.62 THOUSANDS/ΜL (ref 1.85–7.62)
NEUTS SEG NFR BLD AUTO: 69 % (ref 43–75)
NRBC BLD AUTO-RTO: 0 /100 WBCS
PLATELET # BLD AUTO: 100 THOUSANDS/UL (ref 149–390)
PMV BLD AUTO: 11.5 FL (ref 8.9–12.7)
PROTHROMBIN TIME: 15.7 SECONDS (ref 11.8–14.2)
RBC # BLD AUTO: 4.33 MILLION/UL (ref 3.88–5.62)
WBC # BLD AUTO: 5.27 THOUSAND/UL (ref 4.31–10.16)

## 2018-11-05 PROCEDURE — 85025 COMPLETE CBC W/AUTO DIFF WBC: CPT | Performed by: INTERNAL MEDICINE

## 2018-11-05 PROCEDURE — 70496 CT ANGIOGRAPHY HEAD: CPT

## 2018-11-05 PROCEDURE — 99232 SBSQ HOSP IP/OBS MODERATE 35: CPT | Performed by: INTERNAL MEDICINE

## 2018-11-05 PROCEDURE — 85610 PROTHROMBIN TIME: CPT | Performed by: INTERNAL MEDICINE

## 2018-11-05 PROCEDURE — 97116 GAIT TRAINING THERAPY: CPT

## 2018-11-05 PROCEDURE — 92526 ORAL FUNCTION THERAPY: CPT

## 2018-11-05 PROCEDURE — 70498 CT ANGIOGRAPHY NECK: CPT

## 2018-11-05 PROCEDURE — 82948 REAGENT STRIP/BLOOD GLUCOSE: CPT

## 2018-11-05 PROCEDURE — 99232 SBSQ HOSP IP/OBS MODERATE 35: CPT | Performed by: NEUROLOGICAL SURGERY

## 2018-11-05 PROCEDURE — 97110 THERAPEUTIC EXERCISES: CPT

## 2018-11-05 RX ADMIN — ACETAMINOPHEN 650 MG: 325 TABLET, FILM COATED ORAL at 17:27

## 2018-11-05 RX ADMIN — DOCUSATE SODIUM 100 MG: 100 CAPSULE, LIQUID FILLED ORAL at 17:27

## 2018-11-05 RX ADMIN — ENOXAPARIN SODIUM 40 MG: 40 INJECTION SUBCUTANEOUS at 08:19

## 2018-11-05 RX ADMIN — DOCUSATE SODIUM 100 MG: 100 CAPSULE, LIQUID FILLED ORAL at 08:21

## 2018-11-05 RX ADMIN — SODIUM CHLORIDE 75 ML/HR: 0.9 INJECTION, SOLUTION INTRAVENOUS at 09:56

## 2018-11-05 RX ADMIN — LISINOPRIL 20 MG: 20 TABLET ORAL at 08:20

## 2018-11-05 RX ADMIN — IOHEXOL 85 ML: 350 INJECTION, SOLUTION INTRAVENOUS at 12:40

## 2018-11-05 RX ADMIN — ACETAMINOPHEN 650 MG: 325 TABLET, FILM COATED ORAL at 08:29

## 2018-11-05 RX ADMIN — ATORVASTATIN CALCIUM 40 MG: 40 TABLET, FILM COATED ORAL at 17:27

## 2018-11-05 RX ADMIN — PANTOPRAZOLE SODIUM 40 MG: 40 TABLET, DELAYED RELEASE ORAL at 05:21

## 2018-11-05 RX ADMIN — METOPROLOL SUCCINATE 50 MG: 50 TABLET, EXTENDED RELEASE ORAL at 08:21

## 2018-11-05 RX ADMIN — AMLODIPINE BESYLATE 5 MG: 5 TABLET ORAL at 08:21

## 2018-11-05 RX ADMIN — ACETAMINOPHEN 650 MG: 325 TABLET, FILM COATED ORAL at 02:38

## 2018-11-05 NOTE — PROGRESS NOTES
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 CTA head and neck w wo contrast (11/05/2018):  redemonstration of large soft tissue mass centered at the left nasopharyngeal wall with multiregional extension to the oropharynx, left , carotid, and left parapharyngeal spaces as described  The mass encases distal left cervical internal carotid artery without significant narrowing  Remainder of major cervical and intracranial arteries are patent  Moderate presumably atherosclerotic narrowing of mid M1 segment of left MCA  · Repeat CT head STAT if GCS declines > 2pts/1hr  · Medical management per primary team  · DVT ppx: SCDs and Lovenox  · Mobilize as tolerated with assistance  ? PT / OT  · ENT following  · Recommend transnasal nasopharyngeal biopsy either outpatient under local anesthesia or OR  · Neurosurgery will follow PRN hospitalization  Please call with questions or concerns  Subjective/Objective   Chief Complaint: "I am fine " / follow up nasopharyngeal mass    Subjective:  Patient complains of a sinus headache rated 5-6/10 which was made better with Tylenol  He reports he had some trouble with his vision this morning in terms of focusing but has resolved now  Patient is in a hard time sleeping here and finds himself sleeping through the day and awake at night    Patient had 1 bowel movement yesterday; he is urinating although not as much as when he is at home  He states people have a hard time understanding him with his speech  He denies dizziness, changes in hearing, numbness, tingling, weakness, chest pain, shortness of breath, neck or back pain  Objective:  Sitting in the chair, NAD    I/O       11/03 0701 - 11/04 0700 11/04 0701 - 11/05 0700 11/05 0701 - 11/06 0700    P  O  540 560 120    I V  (mL/kg) 1875 (21 2) 1793 8 (20 3) 307 5 (3 5)    Total Intake(mL/kg) 2415 (27 3) 2353 8 (26 6) 427 5 (4 8)    Urine (mL/kg/hr) 2147 (1) 1625 (0 8) 545 (0 8)    Total Output 2147 1625 545    Net +268 +728 8 -117 5           Unmeasured Urine Occurrence 1 x 1 x           Invasive Devices     Peripheral Intravenous Line            Peripheral IV 11/02/18 Right Forearm 3 days    Peripheral IV 11/05/18 Right Antecubital less than 1 day                Physical Exam:  Vitals: Blood pressure (!) 179/43, pulse 87, temperature 98 2 °F (36 8 °C), temperature source Oral, resp  rate 18, height 6' 1" (1 854 m), weight 88 5 kg (195 lb), SpO2 98 %  ,Body mass index is 25 73 kg/m²      General appearance: alert, appears stated age, cooperative and no distress  Head: Normocephalic, without obvious abnormality, atraumatic  Eyes: EOMI, PERRL, visual fields intact  Neck:  Nontender to palpation  Back: no kyphosis present, no tenderness to percussion or palpation  Lungs: non labored breathing  Heart: regular heart rate  Neurologic:   Mental status: Alert, oriented x 3, able to name 3/3 objects and recall 3/3 objects after 5 minutes, follows commands, he was reducible calculations, able to repeat a sentence but speech is muffled, thought content appropriate  Cranial nerves: grossly intact (Cranial nerves II-XII)  Sensory: normal to LT, the JPS 3/3, DST intact  Motor: moving all extremities without focal weakness, strength 5/5 BUE and 4/5 BLE  Reflexes: 2+ and symmetric, +right martin, no clonus noted  Coordination: finger to nose normal bilaterally, no drift bilaterally    Lab Results:    Results from last 7 days  Lab Units 11/05/18  0531 11/04/18  0546 11/03/18  0552   WBC Thousand/uL 5 27 4 39 5 58   HEMOGLOBIN g/dL 11 0* 11 1* 11 1*   HEMATOCRIT % 35 6* 35 9* 36 0*   PLATELETS Thousands/uL 100* 96* 100*   NEUTROS PCT % 69 68 70   MONOS PCT % 7 6 7       Results from last 7 days  Lab Units 11/04/18  0546 11/02/18  0600   POTASSIUM mmol/L 3 7 4 0   CHLORIDE mmol/L 104 105   CO2 mmol/L 27 26   BUN mg/dL 14 15   CREATININE mg/dL 0 88 1 09   CALCIUM mg/dL 8 2* 8 4               Results from last 7 days  Lab Units 11/05/18  0531 11/04/18  1237   INR  1 24* 1 20*     No results found for: TROPONINT  ABG:No results found for: PHART, XFQ5HSY, PO2ART, UYP8HMC, N2GMDGIX, BEART, SOURCE    Imaging Studies: I have personally reviewed pertinent reports  and I have personally reviewed pertinent films in PACS    Cta Head And Neck W Wo Contrast    Result Date: 11/5/2018  Impression: 1  Redemonstration of large soft tissue mass centered at the left nasopharyngeal wall with multiregional extension to the oropharynx, left , carotid , and left parapharyngeal spaces as described  The mass encases distal left cervical internal  carotid artery without significant narrowing  None of left internal jugular vein throughout its course in the neck  2   The remainder of the major cervical and intracranial arteries are patent  Moderate presumably atherosclerotic narrowing of mid M1 segment of left MCA  No aneurysm  3   A 1 cm left level 2A lymph node  4   Partially seen right upper lobe nodular opacities, new from 8/30/2018, concerning for infection  Also mildly prominent mediastinal lymph nodes in visualized upper chest  5   Left thyroid lobe 1 1 cm nodule  According to guidelines published in the February 2015 white paper on incidental thyroid nodules, recommend dedicated ultrasound for further evaluation    I personally discussed this study with Orlando Ray on 11/5/2018 at 3:48 PM  Workstation performed: [id number]     EKG, Pathology, and Other Studies: I have personally reviewed pertinent reports        VTE Pharmacologic Prophylaxis: Enoxaparin (Lovenox)    VTE Mechanical Prophylaxis: sequential compression device

## 2018-11-05 NOTE — TREATMENT PLAN
Received call from radiologist regarding findings on CT of head/neck  Notified that the nasopharyngeal mass encases the distal left cervical internal carotid artery without significant narrowing/obstruction of flow  Also notified of nonspecific nodule opacities in the right upper lung on previously seen on imaging with radiology expressing clinical evaluation for possible infection  He continues to remain afebrile without leukocytosis/coughing or other signs of lower respiratory infection

## 2018-11-05 NOTE — PLAN OF CARE
Problem: PHYSICAL THERAPY ADULT  Goal: Performs mobility at highest level of function for planned discharge setting  See evaluation for individualized goals  Treatment/Interventions: Functional transfer training, LE strengthening/ROM, Therapeutic exercise, Endurance training, Patient/family training, Equipment eval/education, Bed mobility, Gait training, Spoke to nursing, Family  Equipment Recommended: Kassie Gan (TINY)       See flowsheet documentation for full assessment, interventions and recommendations  Outcome: Progressing  Prognosis: Guarded  Problem List: Decreased strength, Decreased range of motion, Decreased endurance, Impaired balance, Decreased mobility, Impaired judgement  Assessment: Pt  progressing well with mobility and needed less assistance this session  Noted gait to be unsteady and needed Min A  Shuffling gait with decreased foot clearance noted  Pt  unable to follow the cues given for long yon and increased foot clearance  Performed repeated STS transfers for recliner 5 x 2 with S  Pt  reported fatigue with TE's  Recommend rehab at D/c to improve and maximize functional mobility and endurance  Barriers to Discharge: Decreased caregiver support, Inaccessible home environment     Recommendation: Other (Comment) (rehab)     PT - OK to Discharge: Yes (when medically cleared)    See flowsheet documentation for full assessment

## 2018-11-05 NOTE — SPEECH THERAPY NOTE
Speech Language/Pathology    Speech/Language Pathology Progress Note    Patient Name: Travis COMBS Date: 11/5/2018     Problem List  Patient Active Problem List   Diagnosis    Nasopharyngeal mass    Orthostatic hypotension    New onset atrial flutter    Diabetes mellitus type 2    Essential hypertension    Recurrent falls    CAD (coronary artery disease)        Past Medical History  Past Medical History:   Diagnosis Date    Coronary artery disease     Diabetes mellitus ([facility] [address] )     Hemoptysis     Hypertension     Prostate cancer ([facility] [address] )         Past Surgical History  Past Surgical History:   Procedure Laterality Date    BRONCHOSCOPY      CORONARY ANGIOPLASTY WITH STENT PLACEMENT      PROSTATECTOMY           Subjective:  Pt awake and sitting upright in the chair  Objective:  Pt seen for ongoing dysphagia tx  Pt initially refused to eat and reported being dizzy, RN made aware  Pt given some time and then upon return, pt observed to be eating small amount of scrambled eggs with mildly prolonged manipulation  Pt's dentures on table but despite multiple attempts to assist pt in placing dentures, pt refused  Pt reported they are ill fitting and he has not been wearing them much  When discussing food tolerance, pt continually referenced a recent meal of meatloaf and mashed potatoes that he was able to consume without difficulty  When discussing pt's diet at home, pt reported he eats a lot of softer foods such as oatmeal and chunky soups  Pt now also reporting difficulty swallowing harder foods due to nasopharyngeal mass  Through out session, pt tolerated soft scrambled eggs and thin liquids c no overt s/s penetration/aspiration  Provided education to pt on downgrading diet to level 2 to allow for soft, easily masticated foods c gravy and sauces  Pt expressed understanding and agreeable  Assessment:  Pt tolerating soft foods only and refusing to wear dentures due to poor fit at this time  Plan/Recommendations:  Downgrade to dysphagia level 2 c thin liquids  Cont pt/family education on food prep

## 2018-11-05 NOTE — PHYSICAL THERAPY NOTE
Physical Therapy Progress Note     11/05/18 3872   Pain Assessment   Pain Assessment No/denies pain   Restrictions/Precautions   Weight Bearing Precautions Per Order No   Other Precautions Fall Risk;Telemetry; Chair Alarm   General   Chart Reviewed Yes   Family/Caregiver Present No   Cognition   Overall Cognitive Status WFL   Subjective   Subjective Agreeable to PT  Reported no pain  however reported his concerns about nasal mass and the upcoming procedure  Transfers   Sit to Stand 5  Supervision   Additional items Assist x 1; Increased time required;Verbal cues;Armrests   Stand to Sit 5  Supervision   Additional items Assist x 1;Verbal cues;Armrests   Stand pivot 5  Supervision   Additional items Assist x 1;Verbal cues   Ambulation/Elevation   Gait pattern Poor UE support;Decreased foot clearance; Forward Flexion; Shuffling; Short stride; Excessively slow; Foward flexed; Inconsistent yon   Gait Assistance 4  Minimal assist   Additional items Assist x 1;Verbal cues   Assistive Device Rolling walker   Distance 140ft   Balance   Static Sitting Good   Ambulatory Poor +   Endurance Deficit   Endurance Deficit Yes   Endurance Deficit Description Fatigue   Activity Tolerance   Activity Tolerance Patient limited by fatigue;Patient tolerated treatment well   Nurse Made Aware yes   Exercises   Knee AROM [employer] reps; Sitting;Bilateral   Marching Bilateral;AROM;20 reps;Standing   Assessment   Prognosis Guarded   Problem List Decreased strength;Decreased range of motion;Decreased endurance; Impaired balance;Decreased mobility; Impaired judgement   Assessment Pt  progressing well with mobility and needed less assistance this session  Noted gait to be unsteady and needed Min A  Shuffling gait with decreased foot clearance noted  Pt  unable to follow the cues given for long yon and increased foot clearance  Performed repeated STS transfers for recliner 5 x 2 with S  Pt  reported fatigue with TE's   Recommend rehab at D/c to improve and maximize functional mobility and endurance  Barriers to Discharge Decreased caregiver support; Inaccessible home environment   Goals   Patient Goals None reportyed   STG Expiration Date 11/13/18   Treatment Day 1   Plan   Treatment/Interventions Functional transfer training;LE strengthening/ROM; Therapeutic exercise;Patient/family training;Equipment eval/education;Gait training;Spoke to nursing   Progress Progressing toward goals   PT Frequency Other (Comment)  (3-5x/week)   Recommendation   Recommendation Other (Comment)  (rehab)   Equipment Recommended Beto Li   PT - OK to Discharge Yes  (when medically cleared)         Sergio West, COLE

## 2018-11-05 NOTE — PROGRESS NOTES
[facility] Hospitalist Service - Internal Medicine Progress Note       PATIENT INFORMATION      Patient: Manas Mcdaniel 80 y o  male   MRN: [id number]  PCP: Eirs Jones DO  Unit/Bed#: [facility]PHP 620-01 Encounter: [id number]  Date Of Visit: 11/05/18       ASSESSMENTS & PLAN     Nasopharyngeal mass   Assessment & Plan    - transferred from [facility] for nasopharyngeal/skull base mass measuring 6 9 x 5 5 cm on CT imaging which previously measured 3 3 x 2 8 cm in July 2016  - would ideally need biopsy for definitive diagnosis and treatment plan - appreciate neurosurgery input who recommended ENT consult to proceed with biopsy - discussed with ENT yesterday after their discussion with family and plan will be to pursue transnasal approach biopsy in the outpatient setting approximately one week post-discharge  - PRN pain control and supportive care otherwise    - PRN nasal spray for congestion    - speech/swallow evaluation appreciated - now recommending mechanical soft diet with thin liquids     Orthostatic hypotension   Assessment & Plan    - prior to transfer, etiology was presumed to be recent Lasix use for lower extremity edema coupled with mild dehydration  - discontinued Lasix - serial orthostatic checks every morning (ordered)   - cardiology input appreciated who are okay w/ higher baseline BP readings to offset positional drops   - awaiting CT angiogram of head/neck to assess for carotid insufficiency in postural dizziness (?? mass compression)   - echocardiogram reveals an EF of 55% with trace MR/TR but no evidence of LV wall motion abnormalities per reading cardiologist  - cardiology discontinued IV fluids this morning and Norvasc but recommending continuation of Toprol-XL and ACEI at current doses for atrial flutter and hypertension respectively   - plan for eventual discharge when orthostatics stabilize     New onset atrial flutter   Assessment & Plan    - EKGs noted - currently rate controlled on Toprol-XL  - Coumadin will remain held anticipating neurosurgical intervention/biopsy within a week post discharge  - [facility] diagnosis at [facility] prior to transfer     Essential hypertension   Assessment & Plan    - continue Zestril/Toprol-XL - Norvasc discontinued by cardiology today  - as stated above, cardiology is okay with slightly higher baseline resting BP to offset positional drop from orthostasis     CAD (coronary artery disease)   Assessment & Plan    - status post prior stenting  - continue Lipitor/Zestril/Toprol-XL      Diabetes mellitus type 2   Assessment & Plan    - HbA1c well controlled at 5 8   - on SSI coverage per Accu-Cheks       VTE Prophylaxis:  Lovenox      SUBJECTIVE     Seen/examined earlier today with nursing during rounds  Still complains of intermittent dizziness although improved from initial presentation  Nasal congestion is also improving  OBJECTIVE     Vitals:   Temp (24hrs), Av 9 °F (36 6 °C), Min:97 7 °F (36 5 °C), Max:98 2 °F (36 8 °C)    Temp:  [97 7 °F (36 5 °C)-98 2 °F (36 8 °C)] 98 2 °F (36 8 °C)  HR:  [61-87] 87  Resp:  [18-20] 18  BP: ()/(43-73) 179/43  SpO2:  [96 %-100 %] 98 %  Body mass index is 25 73 kg/m²  Input and Output Summary (last 24 hours):        Intake/Output Summary (Last 24 hours) at 18 1505  Last data filed at 18 1427   Gross per 24 hour   Intake          1871 25 ml   Output             1895 ml   Net           -23 75 ml       Physical Exam:     GENERAL:  Well-developed/nourished - no acute distress  HEAD:  Normocephalic - atraumatic   EYES: PERRL - EOMI   MOUTH:  Mucosa moist  NECK:  Supple - full range of motion  CARDIAC:  Regular rate/rhythm - S1/S2 positive  PULMONARY:  Clear breath sounds bilaterally - nonlabored respirations  ABDOMEN:  Soft - nontender/nondistended - active bowel sounds  MUSCULOSKELETAL:  Motor strength/range of motion mildly deconditioned  NEUROLOGIC:  Alert/oriented - no nystagmus   SKIN:  Chronic wrinkles/blemishes   PSYCHIATRIC:  Mood/affect stable      ADDITIONAL DATA       Labs & Recent Cultures:       Results from last 7 days  Lab Units 11/05/18  0531   WBC Thousand/uL 5 27   HEMOGLOBIN g/dL 11 0*   HEMATOCRIT % 35 6*   PLATELETS Thousands/uL 100*   NEUTROS PCT % 69   LYMPHS PCT % 23   MONOS PCT % 7   EOS PCT % 1       Results from last 7 days  Lab Units 11/04/18  0546   POTASSIUM mmol/L 3 7   CHLORIDE mmol/L 104   CO2 mmol/L 27   BUN mg/dL 14   CREATININE mg/dL 0 88   CALCIUM mg/dL 8 2*       Results from last 7 days  Lab Units 11/05/18  0531   INR  1 24*         Last 24 Hours Medication List:     Current Facility-Administered Medications:  acetaminophen 650 mg Oral Q6H PRN Lawrence Heath MD   atorvastatin 40 mg Oral Daily With Shana Pandya MD   cloNIDine 0 1 mg Oral Q8H PRN Earna Base, PA-C   docusate sodium 100 mg Oral BID Connor Shane MD   enoxaparin 40 mg Subcutaneous Q24H [facility] Connor Shane MD   insulin lispro 1-5 Units Subcutaneous HS Lawrence Heath MD   insulin lispro 1-6 Units Subcutaneous TID Zara Vasques MD   lisinopril 20 mg Oral Daily Heather Stoll MD   metoprolol succinate 50 mg Oral Daily Lawrence Heath MD   pantoprazole 40 mg Oral Early Morning Lawrence Heath MD   polyethylene glycol 17 g Oral Daily PRN Connor Shane MD   sodium chloride 2 spray Each Nare Q4H PRN Connor Shane MD          Time Spent for Care: 33 minutes  More than 50% of total time spent on counseling and coordination of care as described above  Current Length of Stay: 4 day(s)      Code Status: Level 1 - Full Code         ** Please Note: This note is constructed using a voice recognition dictation system   **

## 2018-11-05 NOTE — PROGRESS NOTES
Progress Note - Cardiology   Lui French 80 y o  male MRN: [id number]  Encounter: [id number]  11/05/18  10:04 AM        Assessment/Plan:    1  HTN/orthostatic hypotension  On Amlodipine 5, Lisinopril 20, and Metoprolol  Still orthostatic by vitals this morning, and patient reported he was symptomatic  Will stop Amlodipine 5  Do not treat supine elevated BPs, if elevated recheck with standing BP prior to increasing BP meds  2  Persistent atrial flutter  Rate controlled  On Metoprolol succinate 50 mg daily  Holding anticoagulation (coumadin) until patient undergoes biopsy as outpt  3  CAD  On beta blocker, statin, no ASA due to coumadin use  Will follow with outside cardiologist after discharge, Dr Hayes Munson  Will sign off, call with questions  Subjective/Objective   Chief Complaint: No chief complaint on file  Subjective: 80year old man with a history of HTN, HL, CAD s/p stenting, DM, atrial flutter on coumadin, admitted with recurrent falls, found to have orthostatic hypotension due to diuretic use  Also found to have nasopharyngeal mass  ENT has seen patient, recommending CTA to assess for vascular involvement, planning for outpt biopsy  Patient had orthostatic vitals this AM, which were positive, he did feel lightheaded  He reports he feels lightheaded every morning  Still on IVF at 75 cc/hr  No CP or SOB         Patient Active Problem List   Diagnosis    Nasopharyngeal mass    Orthostatic hypotension    New onset atrial flutter    Diabetes mellitus type 2    Essential hypertension    Recurrent falls    CAD (coronary artery disease)     Past Medical History:   Diagnosis Date    Coronary artery disease     Diabetes mellitus (HCC)     Hemoptysis     Hypertension     Prostate cancer (HCC)        Allergies   Allergen Reactions    Plavix [Clopidogrel] Rash       Current Facility-Administered Medications   Medication Dose Route Frequency Provider Last Rate Last Dose    acetaminophen (TYLENOL) tablet 650 mg  650 mg Oral Q6H PRN Court Contreras MD   650 mg at 11/05/18 0829    amLODIPine (NORVASC) tablet 5 mg  5 mg Oral Daily Court Contreras MD   5 mg at 11/05/18 0374    atorvastatin (LIPITOR) tablet 40 mg  40 mg Oral Daily With Alessandro Jiménez MD   40 mg at 11/04/18 1629    cloNIDine (CATAPRES) tablet 0 1 mg  0 1 mg Oral Q8H PRN Jessie Chavarria PA-C        docusate sodium (COLACE) capsule 100 mg  100 mg Oral BID Jaqueline Khalil MD   100 mg at 11/05/18 9038    enoxaparin (LOVENOX) subcutaneous injection 40 mg  40 mg Subcutaneous Q24H [address] Jaqueline Khalil MD   40 mg at 11/05/18 0819    insulin lispro (HumaLOG) 100 units/mL subcutaneous injection 1-5 Units  1-5 Units Subcutaneous HS Court Contreras MD        insulin lispro (HumaLOG) 100 units/mL subcutaneous injection 1-6 Units  1-6 Units Subcutaneous TID AC Jaqueline Khalil MD        lisinopril (ZESTRIL) tablet 20 mg  20 mg Oral Daily Tariq Khan MD   20 mg at 11/05/18 0820    metoprolol succinate (TOPROL-XL) 24 hr tablet 50 mg  50 mg Oral Daily Court Contreras MD   50 mg at 11/05/18 0821    pantoprazole (PROTONIX) EC tablet 40 mg  40 mg Oral Early Morning Court Contreras MD   40 mg at 11/05/18 0521    polyethylene glycol (MIRALAX) packet 17 g  17 g Oral Daily PRN Jaqueline Khalil MD        sodium chloride (OCEAN) 0 65 % nasal spray 2 spray  2 spray Each Nare Q4H PRN Jaqueline Khalil MD        sodium chloride 0 9 % infusion  75 mL/hr Intravenous Continuous Jaqueline Khalil MD   Stopped at 11/05/18 0957       Vitals: BP (!) 179/43   Pulse 87   Temp 98 2 °F (36 8 °C) (Oral)   Resp 18   Ht 6' 1" (1 854 m)   Wt 88 5 kg (195 lb)   SpO2 98%   BMI 25 73 kg/m²     Intake/Output Summary (Last 24 hours) at 11/05/18 1004  Last data filed at 11/05/18 0957   Gross per 24 hour   Intake          2541 25 ml   Output             1645 ml   Net           896 25 ml     Wt Readings from Last 3 Encounters:   11/01/18 88 5 kg (195 lb)       Body mass index is 25 73 kg/m²  ,     Vitals:    11/05/18 0659 11/05/18 0812 11/05/18 0815 11/05/18 0820   BP: (!) 179/73 124/70 (!) 92/47 (!) 179/43   Pulse: 61 80 87    Patient Position - Orthostatic VS:  Sitting - Orthostatic VS Standing - Orthostatic VS        Physical Exam:     GEN: Awake and alert, in no acute distress  HEENT: Sclera anicteric, conjunctivae pink, mucous membranes moist   NECK: Supple, no carotid bruits, no significant JVD  HEART: Regular rhythm, normal S1 and S2, no murmurs, clicks, gallops or rubs  LUNGS: Clear to auscultation bilaterally; no wheezes, rales, or rhonchi   ABDOMEN: Soft, nontender, nondistended, normoactive bowel sounds  EXTREMITIES: Skin warm and well perfused, no clubbing, cyanosis, or edema  NEURO: No focal findings  SKIN: Normal without suspicious lesions on exposed skin        Lab Results:     BMP:  Results from last 7 days  Lab Units 11/04/18  0546 11/02/18  0600   POTASSIUM mmol/L 3 7 4 0   CHLORIDE mmol/L 104 105   CO2 mmol/L 27 26   BUN mg/dL 14 15   CREATININE mg/dL 0 88 1 09   CALCIUM mg/dL 8 2* 8 4       CBC:   Results from last 7 days  Lab Units 11/05/18  0531 11/04/18  0546 11/03/18  0552 11/02/18  0600   WBC Thousand/uL 5 27 4 39 5 58 6 69   HEMOGLOBIN g/dL 11 0* 11 1* 11 1* 10 5*   HEMATOCRIT % 35 6* 35 9* 36 0* 33 8*   MCV fL 82 82 83 82   PLATELETS Thousands/uL 100* 96* 100* 102*   MCH pg 25 4* 25 3* 25 6* 25 5*   MCHC g/dL 30 9* 30 9* 30 8* 31 1*   RDW % 16 0* 16 1* 16 3* 16 6*   MPV fL 11 5 10 7 10 9 11 8   NRBC AUTO /100 WBCs 0 0 0  --      INR:     Results from last 7 days  Lab Units 11/05/18  0531 11/04/18  1237   INR  1 24* 1 20*       Lipid Profile:   No results found for: CHOL  No results found for: HDL  No results found for: LDLCALC  No results found for: TRIG      Hgb A1c:   Results from last 7 days  Lab Units 11/02/18  0600   HEMOGLOBIN A1C % 5 8         Telemetry: not on tele

## 2018-11-05 NOTE — MALNUTRITION/BMI
This medical record reflects one or more clinical indicators suggestive of malnutrition  Malnutrition Findings:   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)  Degree of Malnutrition: Malnutrition of moderate degree (evidenced by 20% wt loss x1 month; 9% wt loss x5 months and intake meeting less than 75% estimated needs for greater than 1 month; treatment includes textured controlled meals and glucerna supplement tid)  Malnutrition Characteristics: Inadequate energy, Weight loss        See Nutrition note dated 11/5/18 for additional details  Completed nutrition assessment is viewable in the nutrition documentation

## 2018-11-05 NOTE — RESTORATIVE TECHNICIAN NOTE
Restorative Specialist Mobility Note       Activity: Ambulate in reid, Ambulate in room, Bathroom privileges, Chair, Stand at bedside (Educated/encouraged pt to ambulate with assistance 3-4 x's/day  Chair alarm on   Pt callbell, phone/tray within reach )     Assistive Device: Front wheel walker          Mat VELASQUEZ, Restorative Technician, [employer] [facility]

## 2018-11-05 NOTE — UTILIZATION REVIEW
Continued Stay Review    [address]  Utilization Review Department  Phone: [phone]; Fax [phone]  ATTENTION: Please call with any questions or concerns to [phone]  and carefully listen to the prompts so that you are directed to the right person  Send all requests for admission clinical reviews, approved or denied determinations and any other requests to fax [phone]   All voicemails are confidential     Date: 11/5/2018    Vital Signs: BP (!) 179/43   Pulse 87   Temp 98 2 °F (36 8 °C) (Oral)   Resp 18   Ht 6' 1" (1 854 m)   Wt 88 5 kg (195 lb)   SpO2 98%   BMI 25 73 kg/m²   11/05/18 0815  --  87  --   92/47  98 %  --  Standing - Orthostatic VS   11/05/18 0812  --  80  --  124/70  96 %  --  Sitting - Orthostatic VS       Medications:   Scheduled Meds:   Current Facility-Administered Medications:  acetaminophen 650 mg Oral Q6H PRN 11/3 x 2  11/4 x 2   11/5 x 2    atorvastatin 40 mg Oral Daily With Dinner    cloNIDine 0 1 mg Oral Q8H PRN    docusate sodium 100 mg Oral BID    enoxaparin 40 mg Subcutaneous Q24H FOUZIA    insulin lispro 1-5 Units Subcutaneous HS    insulin lispro 1-6 Units Subcutaneous TID AC    lisinopril 20 mg Oral Daily Added on 11/4 am    metoprolol succinate 50 mg Oral Daily    pantoprazole 40 mg Oral Early Morning    polyethylene glycol 17 g Oral Daily PRN    sodium chloride 2 spray Each Nare Q4H PRN    Norvasc  5 mg  Oral  QD D/c'd 11/5 am         NS @ 75 ml/hr -  D/c'd on 1/5 @ 10:16      Nursing Orders - Speech Swallow eval - diet regular( soft foods)  w/ thin liquids -     Abnormal Labs/Diagnostic Results:  CT Angiogram  Of Head &  neck  - pending read   echocardiogram reveals an EF of 55% with trace MR/TR but no evidence of LV wall motion abnormalities per reading cardiologist    Age/Sex: 80 y o  male     Assessment/Plan:  NeuroSurgery consult  On 11/2 - I reviewed the outside imaging now placed in PACS  · Large nasopharyngeal mass more on the left side  · No intracranial extension  · Reason volume to the left side of the clivus and ethmoid bones and left maxillary sinus with extension ventrally down to the C3 level anterior to the anterior longitudinal ligament  · There is encasement of the left internal carotid artery maximal medial to the left styloid process  ? 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 PT / OT  · Neurosurgery will continue to follow  Cardiology Consult - 11/3 - Impression:  1  Orthostatic hypotension -may be 2nd to nasopharyngeal mass  Would tolerate higher blood pressures at baseline (i e  160's-170's) than hypotension for now  Also, would keep hydrated  2  Nasopharyngeal mass - awaiting w/u   3  Atrial flutter - rate controlled  Holding anticoagulation at this time      Recommendations:  1  Decrease lisinopril  2  Continue remainder of medications  3  Await w/u for nasopharyngeal mass  11/5 -  Stop amlodipine  Check orthostatic BP's      ENT Consult 11/4 - It is my impression that Merly Zacarias has a large nasopharyngeal and oropharyngeal mass  This may be lymphoma  It may be amenable to transnasal nasopharyngeal biopsy      I also recommend a speech and swallow evaluation to determine if there is any compromise to his or pharyngeal swallow due to mass effect   Biopsy to be done as an OP in the office post discharge             79 yo m with known nasopharyngeal mass - increased in size - plan OP biopsy by ENT -  Following CT  pending read done 11/4 pm  - Speech therapy eval soft food- thin liquids tolerated - Orthostatic hypotension - new afib  -  Cardiology  Adjusting  meds       Discharge Plan:  INDIRA

## 2018-11-06 PROBLEM — E44.0 MODERATE PROTEIN-CALORIE MALNUTRITION (HCC): Status: ACTIVE | Noted: 2018-11-06

## 2018-11-06 LAB
BASOPHILS # BLD AUTO: 0.02 THOUSANDS/ΜL (ref 0–0.1)
BASOPHILS NFR BLD AUTO: 0 % (ref 0–1)
EOSINOPHIL # BLD AUTO: 0.02 THOUSAND/ΜL (ref 0–0.61)
EOSINOPHIL NFR BLD AUTO: 0 % (ref 0–6)
ERYTHROCYTE [DISTWIDTH] IN BLOOD BY AUTOMATED COUNT: 16.2 % (ref 11.6–15.1)
GLUCOSE SERPL-MCNC: 102 MG/DL (ref 65–140)
GLUCOSE SERPL-MCNC: 102 MG/DL (ref 65–140)
GLUCOSE SERPL-MCNC: 127 MG/DL (ref 65–140)
GLUCOSE SERPL-MCNC: 156 MG/DL (ref 65–140)
HCT VFR BLD AUTO: 39.4 % (ref 36.5–49.3)
HGB BLD-MCNC: 12.2 G/DL (ref 12–17)
IMM GRANULOCYTES # BLD AUTO: 0.03 THOUSAND/UL (ref 0–0.2)
IMM GRANULOCYTES NFR BLD AUTO: 1 % (ref 0–2)
LYMPHOCYTES # BLD AUTO: 1.11 THOUSANDS/ΜL (ref 0.6–4.47)
LYMPHOCYTES NFR BLD AUTO: 23 % (ref 14–44)
MCH RBC QN AUTO: 25.5 PG (ref 26.8–34.3)
MCHC RBC AUTO-ENTMCNC: 31 G/DL (ref 31.4–37.4)
MCV RBC AUTO: 82 FL (ref 82–98)
MONOCYTES # BLD AUTO: 0.34 THOUSAND/ΜL (ref 0.17–1.22)
MONOCYTES NFR BLD AUTO: 7 % (ref 4–12)
NEUTROPHILS # BLD AUTO: 3.34 THOUSANDS/ΜL (ref 1.85–7.62)
NEUTS SEG NFR BLD AUTO: 69 % (ref 43–75)
NRBC BLD AUTO-RTO: 0 /100 WBCS
PLATELET # BLD AUTO: 104 THOUSANDS/UL (ref 149–390)
PMV BLD AUTO: 11.8 FL (ref 8.9–12.7)
RBC # BLD AUTO: 4.78 MILLION/UL (ref 3.88–5.62)
WBC # BLD AUTO: 4.86 THOUSAND/UL (ref 4.31–10.16)

## 2018-11-06 PROCEDURE — 92526 ORAL FUNCTION THERAPY: CPT

## 2018-11-06 PROCEDURE — 82948 REAGENT STRIP/BLOOD GLUCOSE: CPT

## 2018-11-06 PROCEDURE — 99232 SBSQ HOSP IP/OBS MODERATE 35: CPT | Performed by: PHYSICIAN ASSISTANT

## 2018-11-06 PROCEDURE — 85025 COMPLETE CBC W/AUTO DIFF WBC: CPT | Performed by: INTERNAL MEDICINE

## 2018-11-06 RX ORDER — METOPROLOL SUCCINATE 25 MG/1
25 TABLET, EXTENDED RELEASE ORAL DAILY
Status: DISCONTINUED | OUTPATIENT
Start: 2018-11-07 | End: 2018-11-06

## 2018-11-06 RX ORDER — METOPROLOL SUCCINATE 50 MG/1
50 TABLET, EXTENDED RELEASE ORAL DAILY
Status: DISCONTINUED | OUTPATIENT
Start: 2018-11-07 | End: 2018-11-07

## 2018-11-06 RX ORDER — FLUDROCORTISONE ACETATE 0.1 MG/1
0.1 TABLET ORAL DAILY
Status: DISCONTINUED | OUTPATIENT
Start: 2018-11-06 | End: 2018-11-07

## 2018-11-06 RX ADMIN — ACETAMINOPHEN 650 MG: 325 TABLET, FILM COATED ORAL at 09:45

## 2018-11-06 RX ADMIN — METOPROLOL SUCCINATE 50 MG: 50 TABLET, EXTENDED RELEASE ORAL at 09:41

## 2018-11-06 RX ADMIN — LISINOPRIL 20 MG: 20 TABLET ORAL at 09:41

## 2018-11-06 RX ADMIN — PANTOPRAZOLE SODIUM 40 MG: 40 TABLET, DELAYED RELEASE ORAL at 05:30

## 2018-11-06 RX ADMIN — DOCUSATE SODIUM 100 MG: 100 CAPSULE, LIQUID FILLED ORAL at 09:41

## 2018-11-06 RX ADMIN — ENOXAPARIN SODIUM 40 MG: 40 INJECTION SUBCUTANEOUS at 09:41

## 2018-11-06 RX ADMIN — INSULIN LISPRO 1 UNITS: 100 INJECTION, SOLUTION INTRAVENOUS; SUBCUTANEOUS at 12:51

## 2018-11-06 RX ADMIN — DOCUSATE SODIUM 100 MG: 100 CAPSULE, LIQUID FILLED ORAL at 17:36

## 2018-11-06 RX ADMIN — ATORVASTATIN CALCIUM 40 MG: 40 TABLET, FILM COATED ORAL at 17:36

## 2018-11-06 RX ADMIN — FLUDROCORTISONE ACETATE 0.1 MG: 0.1 TABLET ORAL at 12:51

## 2018-11-06 NOTE — PROGRESS NOTES
Progress Note - Joao Morales 1934, 80 y o  male MRN: [id number]    Unit/Bed#: [facility] 620-01 Encounter: [id number]    Primary Care Provider: Jessica Garcia DO   Date and time admitted to hospital: 11/1/2018  8:24 PM        * Nasopharyngeal mass   Assessment & Plan    - transferred from [facility] for nasopharyngeal/skull base mass measuring 6 9 x 5 5 cm on CT imaging which previously measured 3 3 x 2 8 cm in July 2016  - would ideally need biopsy for definitive diagnosis and treatment plan - appreciate neurosurgery input who recommended ENT consult to proceed with biopsy - discussed with ENT after their discussion with family and plan will be to pursue transnasal approach biopsy in the outpatient setting approximately one week post-discharge  - PRN pain control and supportive care otherwise    - PRN nasal spray for congestion    - speech/swallow evaluation appreciated - now recommending mechanical soft diet with thin liquids       Orthostatic hypotension   Assessment & Plan    - prior to transfer, etiology was presumed to be recent Lasix use for lower extremity edema coupled with mild dehydration  - discontinued Lasix - serial orthostatic checks every morning (ordered)   - cardiology input appreciated who are okay w/ higher baseline BP readings to offset positional drops   - CT angiogram of head/neck NOT showing carotid compression  - echocardiogram reveals an EF of 55% with trace MR/TR but no evidence of LV wall motion abnormalities per reading cardiologist  - cardiology discontinued IV fluids and Norvasc but recommending continuation of Toprol-XL and ACEI at current doses for atrial flutter and hypertension respectively   - Patient still orthostatic today and symptomatic  Will add thigh high compression stockings and Florineg 0 1mg daily   Monitor with changes     New onset atrial flutter   Assessment & Plan    - EKGs noted - currently rate controlled on Toprol-XL  - Coumadin will remain held anticipating biopsy within a week post discharge  - recent diagnosis at [facility] prior to transfer     Diabetes mellitus type 2   Assessment & Plan    - HbA1c well controlled at 5 8   - on SSI coverage per Accu-Cheks     Essential hypertension   Assessment & Plan    - continue Zestril/Toprol-XL - Norvasc discontinued by cardiology  - as stated above, cardiology is okay with slightly higher baseline resting BP to offset positional drop from orthostasis     Recurrent falls   Assessment & Plan    · Presented to [address] with recurrent falls attributed to orthostatic hypotension from diuretic use and dehydration  · PT/OT recommending inpatient rehab  · Referrals made by case management     CAD (coronary artery disease)   Assessment & Plan    - status post prior stenting  - continue Lipitor/Zestril/Toprol-XL        Moderate protein-calorie malnutrition (HCC)   Assessment & Plan    Malnutrition Findings:   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)  Degree of Malnutrition: Malnutrition of moderate degree (evidenced by 20% wt loss x1 month; 9% wt loss x5 months and intake meeting less than 75% estimated needs for greater than 1 month; treatment includes textured controlled meals and glucerna supplement tid)    BMI Findings: Body mass index is 25 73 kg/m²  VTE Pharmacologic Prophylaxis:   Pharmacologic: Enoxaparin (Lovenox)  Mechanical VTE Prophylaxis in Place: Yes    Patient Centered Rounds: I have performed bedside rounds with nursing staff today  Discussions with Specialists or Other Care Team Provider: case management    Education and Discussions with Family / Patient: patient  Attempted to call emergency contact, no answer    Time Spent for Care: 30 minutes  More than 50% of total time spent on counseling and coordination of care as described above      Current Length of Stay: 5 day(s)    Current Patient Status: Inpatient Certification Statement: The patient will continue to require additional inpatient hospital stay due to Orthostatic hypotension    Discharge Plan: STR    Code Status: Level 1 - Full Code      Subjective:   Became lightheaded while standing today  C/O sinus congestion  Objective:     Vitals:   Temp (24hrs), Av 9 °F (36 6 °C), Min:97 9 °F (36 6 °C), Max:97 9 °F (36 6 °C)    Temp:  [97 9 °F (36 6 °C)] 97 9 °F (36 6 °C)  HR:  [73-88] 88  Resp:  [20] 20  BP: ()/(47-73) 95/47  SpO2:  [96 %-98 %] 96 %  Body mass index is 25 73 kg/m²  Input and Output Summary (last 24 hours): Intake/Output Summary (Last 24 hours) at 18 1243  Last data filed at 18 1209   Gross per 24 hour   Intake              480 ml   Output             1028 ml   Net             -548 ml       Physical Exam:     Physical Exam   Constitutional: He is oriented to person, place, and time  No distress  thin   HENT:   Head: Normocephalic and atraumatic  Neck: Normal range of motion  Neck supple  Cardiovascular: Normal rate and regular rhythm  No murmur heard  Pulmonary/Chest: Effort normal  No respiratory distress  He has no wheezes  He has no rales  Crackle RUL   Abdominal: Soft  Bowel sounds are normal  He exhibits no distension  Musculoskeletal: He exhibits no edema  Neurological: He is alert and oriented to person, place, and time  No cranial nerve deficit  Skin: Skin is warm and dry  Rash (eczema on face) noted  Psychiatric: He has a normal mood and affect         Additional Data:     Labs:      Results from last 7 days  Lab Units 18  0453   WBC Thousand/uL 4 86   HEMOGLOBIN g/dL 12 2   HEMATOCRIT % 39 4   PLATELETS Thousands/uL 104*   NEUTROS PCT % 69   LYMPHS PCT % 23   MONOS PCT % 7   EOS PCT % 0       Results from last 7 days  Lab Units 18  0546   POTASSIUM mmol/L 3 7   CHLORIDE mmol/L 104   CO2 mmol/L 27   BUN mg/dL 14   CREATININE mg/dL 0 88   ANION GAP mmol/L 4   CALCIUM mg/dL 8 2* Results from last 7 days  Lab Units 11/05/18  0531   INR  1 24*       Results from last 7 days  Lab Units 11/06/18  1203 11/06/18  0753 11/05/18  2101 11/05/18  1716 11/05/18  1202 11/05/18  0804 11/04/18  2104 11/04/18  1623 11/04/18  1242 11/04/18  0852 11/03/18  2051 11/03/18  1650   POC GLUCOSE mg/dl 156* 102 128 109 122 111 132 125 136 111 129 135       Results from last 7 days  Lab Units 11/02/18  0600   HEMOGLOBIN A1C % 5 8               * I Have Reviewed All Lab Data Listed Above  * Additional Pertinent Lab Tests Reviewed: All Labs Within Last 24 Hours Reviewed    Imaging:    Imaging Reports Reviewed Today Include: Echocardiogram, CTA, CT  Imaging Personally Reviewed by Myself Includes:  none    Recent Cultures (last 7 days):           Last 24 Hours Medication List:     Current Facility-Administered Medications:  acetaminophen 650 mg Oral Q6H PRN Manas Serna MD   atorvastatin 40 mg Oral Daily With Javier Iqbal MD   cloNIDine 0 1 mg Oral Q8H PRN Caitlyn Christina PA-C   docusate sodium 100 mg Oral BID Barbie Katz MD   enoxaparin 40 mg Subcutaneous Q24H [facility] Barbie Katz MD   fludrocortisone 0 1 mg Oral Daily Ching Carty PA-C   insulin lispro 1-5 Units Subcutaneous HS Manas Serna MD   insulin lispro 1-6 Units Subcutaneous TID AC Barbie Katz MD   lisinopril 20 mg Oral Daily MD Phill Ontiveros Presume ON 11/7/2018] metoprolol succinate 50 mg Oral Daily Arianna Fitzpatrick PA-C   pantoprazole 40 mg Oral Early Morning Manas Serna MD   polyethylene glycol 17 g Oral Daily PRN Barbie Katz MD   sodium chloride 2 spray Each Nare Q4H PRN Barbie Katz MD        Today, Patient Was Seen By: Ching Carty PA-C    ** Please Note: Dictation voice to text software may have been used in the creation of this document   **

## 2018-11-06 NOTE — ASSESSMENT & PLAN NOTE
· Presented to [facility] with recurrent falls attributed to orthostatic hypotension from diuretic use and dehydration  · PT/OT recommending inpatient rehab  · Referrals made by case management

## 2018-11-06 NOTE — RESTORATIVE TECHNICIAN NOTE
Restorative Specialist Mobility Note       Activity: Ambulate in room, Ambulate in reid, [place] privileges, Chair, Stand at bedside, Dangle (Educated/encouraged pt to ambulate with assistance 3-4 x's/day  Chair alarm on   Pt callbell, phone/tray within reach )     Assistive Device: Front wheel walker             [employer] BS, Restorative Technician, [employer]

## 2018-11-06 NOTE — ASSESSMENT & PLAN NOTE
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

## 2018-11-06 NOTE — SPEECH THERAPY NOTE
Speech Language/Pathology    Speech/Language Pathology Progress Note    Patient Name: Manas Mcdaniel  [id number]'I Date: 11/6/2018      Subjective:  Pt OOB in chair  "I need to get back to bed "  "I can't smell  I have no appetite "    Objective:  Pt seen for f/u dysphagia tx at lunch  Current diet: dysphagia level 2 with thin liquids  Brought in tray with tuna salad, egg salad, nepro  Pt refused stating he has no appetite  After much encouragement, pt only agreeable to Nepro  Took multiple sips by straw with prompt appearing transfer and swallow  Occasional wet vocal quality that pt was cued to clear  Reviewed current diet recs and discussed soft diet  Pt feels this is the appropriate diet when he is hungry  "The tumor is coming into the L side of my mouth "  Oropharynx examined and soft palate appears to be pushed lower into oral cavity on L>R  Discussed importance of adequate nutrition as pt reports he is feeling more tired and weak lately  He states he eats more at night  Meal completion has been poor per I/Os recorded  Assessment:  Mild wet vocal quality with thin liquids today; cleared with cue  Pt refusing solid foods at this time but feels current soft diet is appropriate  Plan/Recommendations:  Continue diet as ordered    Continue nutrition intervention  Continue speech therapy

## 2018-11-06 NOTE — SOCIAL WORK
Met with patient to discuss DCP and PT recommendation for STR  Reviewed SNF list, he would [facility], referral via [facility] did not accept patient due to insurance and not contracted with [facility]  TCT [employer] for providers near where member resides  Provided [facility], advised facility did not accept stating they are not contracted  Provided DILMA RENDON [facility]  TCT Step-Daughter Brett Che, they would like a facility in [place] [place] or [place] which is close to [facility] Teddy is in [place] and contracted by [facility]  She would like referral sent    Spoke with patient and he is agreeable to referral   Referral sent via [facility]

## 2018-11-07 LAB
GLUCOSE SERPL-MCNC: 102 MG/DL (ref 65–140)
GLUCOSE SERPL-MCNC: 121 MG/DL (ref 65–140)
GLUCOSE SERPL-MCNC: 123 MG/DL (ref 65–140)
GLUCOSE SERPL-MCNC: 83 MG/DL (ref 65–140)

## 2018-11-07 PROCEDURE — 97530 THERAPEUTIC ACTIVITIES: CPT

## 2018-11-07 PROCEDURE — 82948 REAGENT STRIP/BLOOD GLUCOSE: CPT

## 2018-11-07 PROCEDURE — 92526 ORAL FUNCTION THERAPY: CPT

## 2018-11-07 PROCEDURE — 99232 SBSQ HOSP IP/OBS MODERATE 35: CPT | Performed by: GENERAL PRACTICE

## 2018-11-07 PROCEDURE — 97110 THERAPEUTIC EXERCISES: CPT

## 2018-11-07 RX ORDER — LISINOPRIL 20 MG/1
20 TABLET ORAL
Status: DISCONTINUED | OUTPATIENT
Start: 2018-11-07 | End: 2018-11-09

## 2018-11-07 RX ORDER — METOPROLOL SUCCINATE 50 MG/1
50 TABLET, EXTENDED RELEASE ORAL
Status: DISCONTINUED | OUTPATIENT
Start: 2018-11-07 | End: 2018-11-10 | Stop reason: HOSPADM

## 2018-11-07 RX ADMIN — METOPROLOL TARTRATE 25 MG: 25 TABLET, FILM COATED ORAL at 11:09

## 2018-11-07 RX ADMIN — DOCUSATE SODIUM 100 MG: 100 CAPSULE, LIQUID FILLED ORAL at 11:09

## 2018-11-07 RX ADMIN — ENOXAPARIN SODIUM 40 MG: 40 INJECTION SUBCUTANEOUS at 11:09

## 2018-11-07 RX ADMIN — LISINOPRIL 20 MG: 20 TABLET ORAL at 21:20

## 2018-11-07 RX ADMIN — ACETAMINOPHEN 650 MG: 325 TABLET, FILM COATED ORAL at 06:08

## 2018-11-07 RX ADMIN — PANTOPRAZOLE SODIUM 40 MG: 40 TABLET, DELAYED RELEASE ORAL at 06:07

## 2018-11-07 RX ADMIN — DOCUSATE SODIUM 100 MG: 100 CAPSULE, LIQUID FILLED ORAL at 18:07

## 2018-11-07 RX ADMIN — METOPROLOL SUCCINATE 50 MG: 50 TABLET, EXTENDED RELEASE ORAL at 21:20

## 2018-11-07 RX ADMIN — ATORVASTATIN CALCIUM 40 MG: 40 TABLET, FILM COATED ORAL at 18:07

## 2018-11-07 NOTE — ASSESSMENT & PLAN NOTE
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

## 2018-11-07 NOTE — WOUND OSTOMY CARE
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, patient suffering with multiple falls and hypotension prior to finding mass  He was seen by wound care for verification and treatment of present on admission pressure injuryto sacrum  Findings:  1-Cristiana cleft with small circular stage 2 pressure injury measuring as documented with 100% pink base  2-L elbow and L knee with stably scab PTW with no periwound erythema or s/s of infection  Heels are pink and blanching, bilateral buttocks are intact  Skin care plans:  1-Calazime to sacrum, buttocks TID and PRN  2-Hydraguard to bilateral heel BID and PRN  3-Elevate heels to offload pressure  4-Soft care cushion when out of bed  5-Turn/repoisiton q2h or when medically stable for pressure re-distribution on skin  6-Moisturize skin daily with skin nourishing cream      Vitals: Blood pressure 132/64, pulse 75, temperature 98 2 °F (36 8 °C), resp  rate 20, height 6' 1" (1 854 m), weight 88 5 kg (195 lb), SpO2 98 %  ,Body mass index is 25 73 kg/m²  Pressure Ulcer 18 Sacrum Inner (Active)   Staging Stage II 2018 11:00 AM   Wound Description Pink 2018 11:00 AM   Bre-wound Assessment Clean;Dry; Intact;Pink;Fragile 2018 10:45 PM   Shape round 2018 11:06 PM   Wound Length (cm) 0 4 cm 2018 11:00 AM   Wound Width (cm) 0 5 cm 2018 11:00 AM   Wound Depth (cm) 0 1 2018 11:00 AM   Calculated Wound Area (cm^2) 0 2 cm^2 2018 11:00 AM   Calculated Wound Volume (cm^3) 0 02 cm^3 2018 11:00 AM   Tunneling 0 cm 2018 11:00 AM   Drainage Amount None 2018 10:45 PM   Dressing Foam, Silicone (eg   Allevyn, etc) 2018 10:45 PM   Dressing Changed New dressing applied 11/3/2018  1:00 PM   Wound packed? No 11/6/2018 10:45 PM   Patient Tolerance Tolerated well 11/7/2018 11:00 AM   Dressing Status Clean;Dry; Intact 11/6/2018 10:45 PM       Primary RN made aware of findings and recommendations, our recommendations were placed as skin care orders  Please call ext [phone] or [phone] with questions or concerns, we will continue following weekly      Shona Bailey RN, BSN, [employer]

## 2018-11-07 NOTE — PROGRESS NOTES
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 on CT imaging which previously measured 3 3 x 2 8 cm in July 2016  - would ideally need biopsy for definitive diagnosis and treatment plan - appreciate neurosurgery input who recommended ENT consult to proceed with biopsy - discussed with ENT after their discussion with family and plan will be to pursue transnasal approach biopsy in the outpatient setting approximately one week post-discharge  - PRN pain control and supportive care otherwise    - PRN nasal spray for congestion    - speech/swallow evaluation appreciated - now recommending mechanical soft diet with thin liquids       Moderate protein-calorie malnutrition (HCC)   Assessment & Plan    Malnutrition Findings:   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)  Degree of Malnutrition: Malnutrition of moderate degree (evidenced by 20% wt loss x1 month; 9% wt loss x5 months and intake meeting less than 75% estimated needs for greater than 1 month; treatment includes textured controlled meals and glucerna supplement tid)    BMI Findings: Body mass index is 25 73 kg/m²          CAD (coronary artery disease)   Assessment & Plan    - status post prior stenting  - continue Lipitor/Zestril/Toprol-XL        Recurrent falls   Assessment & Plan    · Presented to [facility] with recurrent falls attributed to orthostatic hypotension from diuretic use and dehydration  · PT/OT recommending inpatient rehab  · Referrals made by case management     Essential hypertension   Assessment & Plan    - continue Zestril/Toprol-XL - Norvasc discontinued by cardiology  - as stated above, cardiology is okay with slightly higher baseline resting BP to offset positional drop from orthostasis       Diabetes mellitus type 2   Assessment & Plan    - HbA1c well controlled at 5 8   - on SSI coverage per Accu-Cheks     New onset atrial flutter   Assessment & Plan    - EKGs noted - currently rate controlled on Toprol-XL  - Coumadin will remain held anticipating biopsy within a week post discharge  - recent diagnosis at [facility] prior to transfer  - STR can start coumadin once cleared by ENT  No need for bridging  Orthostatic hypotension   Assessment & Plan    - prior to transfer, etiology was presumed to be recent Lasix use for lower extremity edema coupled with mild dehydration  - discontinued Lasix - serial orthostatic checks every morning (ordered)   - cardiology input appreciated who are okay w/ higher baseline BP readings to offset positional drops   - CT angiogram of head/neck NOT showing carotid compression  - echocardiogram reveals an EF of 55% with trace MR/TR but no evidence of LV wall motion abnormalities per reading cardiologist  - cardiology discontinued IV fluids and Norvasc but recommending continuation of Toprol-XL and ACEI at current doses for atrial flutter and hypertension respectively   - Patient still orthostatic 11/6 and symptomatic  Added thigh high compression stockings and Florinef 0 1mg daily  - 11/7: Pt did not get relief from florinef, so d/c  Pt appears more symptomatic from deconditioning > orthostatsis  I have made his BP meds qhs as he is significantly hypertensive supine  Pt encouraged to stand up slowly  Can add abdominal binder if symptomatic  Even if pt orthostatic, acceptable if SBP > 90 when standing       VTE Pharmacologic Prophylaxis:   Pharmacologic: Enoxaparin (Lovenox)  Mechanical VTE Prophylaxis in Place: Yes    Patient Centered Rounds: I have performed bedside rounds with nursing staff today      Discussions with Specialists or Other Care Team Provider: Dr Pastora Jackson    Education and Discussions with Family / Patient: pt and Tosin Stanford    Time Spent for Care: 30 minutes  More than 50% of total time spent on counseling and coordination of care as described above  Current Length of Stay: 6 day(s)    Current Patient Status: Inpatient   Certification Statement: The patient will continue to require additional inpatient hospital stay due to need for STR placement w/ plan for pt to get bx outpt    Discharge Plan: pending STR placement    Code Status: Level 1 - Full Code      Subjective:   Pt fatigued with ambulation    Objective:     Vitals:   Temp (24hrs), Av 1 °F (36 7 °C), Min:97 9 °F (36 6 °C), Max:98 2 °F (36 8 °C)    Temp:  [97 9 °F (36 6 °C)-98 2 °F (36 8 °C)] 97 9 °F (36 6 °C)  HR:  [60-78] 78  Resp:  [20] 20  BP: (132-141)/(64-72) 141/72  SpO2:  [98 %-99 %] 99 %  Body mass index is 25 73 kg/m²  Input and Output Summary (last 24 hours): Intake/Output Summary (Last 24 hours) at 18 1722  Last data filed at 18 0900   Gross per 24 hour   Intake              120 ml   Output              100 ml   Net               20 ml       Physical Exam:     Physical Exam   Constitutional: He is oriented to person, place, and time  No distress  HENT:   Head: Normocephalic and atraumatic  Eyes: Conjunctivae and EOM are normal    Neck: Normal range of motion  Neck supple  Cardiovascular: Normal rate and regular rhythm  Pulmonary/Chest: Effort normal and breath sounds normal  He has no wheezes  He has no rales  Abdominal: Soft  Bowel sounds are normal  He exhibits no distension  There is no tenderness  Musculoskeletal: Normal range of motion  He exhibits no edema  Neurological: He is alert and oriented to person, place, and time  Skin: Skin is warm and dry  He is not diaphoretic         Additional Data:     Labs:      Results from last 7 days  Lab Units 18  0453   WBC Thousand/uL 4 86 HEMOGLOBIN g/dL 12 2   HEMATOCRIT % 39 4   PLATELETS Thousands/uL 104*   NEUTROS PCT % 69   LYMPHS PCT % 23   MONOS PCT % 7   EOS PCT % 0       Results from last 7 days  Lab Units 11/04/18  0546   POTASSIUM mmol/L 3 7   CHLORIDE mmol/L 104   CO2 mmol/L 27   BUN mg/dL 14   CREATININE mg/dL 0 88   CALCIUM mg/dL 8 2*       Results from last 7 days  Lab Units 11/05/18  0531   INR  1 24*       * I Have Reviewed All Lab Data Listed Above  * Additional Pertinent Lab Tests Reviewed: Janeth 66 Admission Reviewed        Recent Cultures (last 7 days):           Last 24 Hours Medication List:     Current Facility-Administered Medications:  acetaminophen 650 mg Oral Q6H PRN Court Contreras MD   atorvastatin 40 mg Oral Daily With Alessandro Jiménez MD   cloNIDine 0 1 mg Oral Q8H PRN Jessie Chavarria PA-C   docusate sodium 100 mg Oral BID Jaqueline Khalil MD   enoxaparin 40 mg Subcutaneous Q24H [facility] Jaqueline Khalil MD   insulin lispro 1-5 Units Subcutaneous HS Court Contreras MD   insulin lispro 1-6 Units Subcutaneous TID AC Jaqueline Khalil MD   lisinopril 20 mg Oral HS Ciera Larsen DO   metoprolol succinate 50 mg Oral HS Ciera Larsen DO   pantoprazole 40 mg Oral Early Morning Court Contreras MD   polyethylene glycol 17 g Oral Daily PRN Jaqueline Khalil MD   sodium chloride 2 spray Each Nare Q4H PRN Jaqueline Khalil MD        Today, Patient Was Seen By: Ciera Larsen DO    ** Please Note: Dictation voice to text software may have been used in the creation of this document   **

## 2018-11-07 NOTE — RESTORATIVE TECHNICIAN NOTE
Restorative Specialist Mobility Note       Activity: Ambulate in reid, Ambulate in room, Bathroom privileges, Chair, Stand at bedside (Educated/encouraged pt to ambulate with assistance 3-4 x's/day  Chair alarm on   Pt callbell, phone/tray within reach )     Assistive Device: Front wheel walker (Assisted PTA Yakov )       [employer] BS, Restorative Technician, [employer]

## 2018-11-07 NOTE — SOCIAL WORK
TCF daughter Charlette Gr inquiring on fathers status and discharge plan  Received permission from patient to speak to her  Daughter [phone]    Would like father to go to [facility]  Advised [facility] did not provide that facility as in network  States her mother has same insurance as father and she was there last year    Explained they could have changed contract status but I will validate with [place]

## 2018-11-07 NOTE — PLAN OF CARE
Problem: PHYSICAL THERAPY ADULT  Goal: Performs mobility at highest level of function for planned discharge setting  See evaluation for individualized goals  Treatment/Interventions: Functional transfer training, LE strengthening/ROM, Therapeutic exercise, Endurance training, Patient/family training, Equipment eval/education, Bed mobility, Gait training, Spoke to nursing, Family  Equipment Recommended: Clyde Blackwood (TINY)       See flowsheet documentation for full assessment, interventions and recommendations  Outcome: Progressing  Prognosis: Fair  Problem List: Decreased strength, Decreased endurance, Impaired balance, Decreased mobility, Decreased coordination  Assessment: Pt is making slow progress with functional mobility  Limited by dizziness today  With attempt to walk pt reported being dizzy  Seated /66, [id number]  Completed seated exercises  Transferred back to bed due to pt request to lay down  Pt would benefit from continued physical therapy to maximize functional mobility and safety  Barriers to Discharge: Decreased caregiver support, Inaccessible home environment     Recommendation:  (Rehab)     PT - OK to Discharge: Yes (when medically cleared)    See flowsheet documentation for full assessment

## 2018-11-07 NOTE — ASSESSMENT & PLAN NOTE
· Presented to [facility] with recurrent falls attributed to orthostatic hypotension from diuretic use and dehydration  · PT/OT recommending inpatient rehab  · Referrals made by case management

## 2018-11-07 NOTE — SPEECH THERAPY NOTE
Speech/Language Pathology Progress Note    Patient Name: Grace Noguera  HHUHT'T Date: 11/7/2018       Subjective:  Pt was awake and alert, agreeable to participate in ST today  Objective:  Pt seen for diagnostic swallow therapy  He reported distaste for food on the menu but agreed to "snack" of oatmeal  Pt reported he is able to feel the mass on the roof of his mouth at times but during session today it was not bothering him  SLP inquired about pt's diet at home asking if he eats mostly soft foods and he stated "not when I'm healthy " Oral manipulation and transfer were functional with oatmeal, although pt is edentulous and he appeared to swallow the oatmeal with no mastication  Question potential to upgrade diet  Mild oral residual post swallow and pt was noted to spit out a few of the oats that remained in his oral cavity post swallow  No s/s aspiration noted with oatmeal or thin liquid  Assessment:  Suspect dysphagia level 2 diet and thin liquids is least restrictive diet at this time, however pt stated he does not like the food and asked if he can have meatloaf  Plan/Recommendations:  Continue current diet and ST f/u, consider trial of upgraded diet

## 2018-11-07 NOTE — PROGRESS NOTES
MD rounding with Dr Lorri Chandra, adjusting BP meds to schedule meds at [facility] and plan for discharge to rehab

## 2018-11-07 NOTE — ASSESSMENT & PLAN NOTE
- prior to transfer, etiology was presumed to be recent Lasix use for lower extremity edema coupled with mild dehydration  - discontinued Lasix - serial orthostatic checks every morning (ordered)   - cardiology input appreciated who are okay w/ higher baseline BP readings to offset positional drops   - CT angiogram of head/neck NOT showing carotid compression  - echocardiogram reveals an EF of 55% with trace MR/TR but no evidence of LV wall motion abnormalities per reading cardiologist  - cardiology discontinued IV fluids and Norvasc but recommending continuation of Toprol-XL and ACEI at current doses for atrial flutter and hypertension respectively   - Patient still orthostatic 11/6 and symptomatic  Added thigh high compression stockings and Florinef 0 1mg daily  - 11/7: Pt did not get relief from florinef, so d/c  Pt appears more symptomatic from deconditioning > orthostatsis  I have made his BP meds qhs as he is significantly hypertensive supine  Pt encouraged to stand up slowly  Can add abdominal binder if symptomatic    Even if pt orthostatic, acceptable if SBP > 90 when standing

## 2018-11-07 NOTE — ASSESSMENT & PLAN NOTE
- EKGs noted - currently rate controlled on Toprol-XL  - Coumadin will remain held anticipating biopsy within a week post discharge  - recent diagnosis at [facility] - FOZIA prior to transfer  - STR can start coumadin once cleared by ENT  No need for bridging

## 2018-11-07 NOTE — PHYSICAL THERAPY NOTE
Physical Therapy Progress Note     11/07/18 1402   Pain Assessment   Pain Assessment No/denies pain   Pain Score No Pain   Restrictions/Precautions   Weight Bearing Precautions Per Order No   Other Precautions Fall Risk;Telemetry   General   Chart Reviewed Yes   Family/Caregiver Present No   Cognition   Overall Cognitive Status WFL   Arousal/Participation Alert; Cooperative   Subjective   Subjective Pt denies pain and agrees to participate  Bed Mobility   Sit to Supine 4  Minimal assistance   Additional items Assist x 1;LE management   Transfers   Sit to Stand (min to mod assist)   Additional items Assist x 1;Verbal cues   Stand to Sit 4  Minimal assistance   Additional items Assist x 1;Verbal cues   Ambulation/Elevation   Gait pattern Short stride;Decreased foot clearance  (slow)   Gait Assistance 4  Minimal assist   Additional items Assist x 1   Assistive Device Rolling walker   Distance 2 feet   Stair Management Assistance Not tested   Balance   Static Sitting Fair -   Dynamic Sitting Poor +   Static Standing Fair -   Dynamic Standing Poor +   Ambulatory Poor +   Endurance Deficit   Endurance Deficit Yes   Endurance Deficit Description dizziness   Activity Tolerance   Activity Tolerance Patient limited by fatigue   Nurse [address] to see per RN Vahid   Exercises   Glute Sets Sitting;20 reps   Hip Flexion Sitting;20 reps;AROM; Bilateral   Knee AROM Long Arc Quad Sitting;20 reps;AROM; Bilateral   Ankle Pumps Sitting;20 reps;AROM; Bilateral   Assessment   Prognosis Fair   Problem List Decreased strength;Decreased endurance; Impaired balance;Decreased mobility; Decreased coordination   Assessment Pt is making slow progress with functional mobility  Limited by dizziness today  With attempt to walk pt reported being dizzy  Seated /66, [id number]  Completed seated exercises  Transferred back to bed due to pt request to lay down    Pt would benefit from continued physical therapy to maximize functional mobility and safety  Goals   Patient Goals To lay down   STG Expiration Date 11/13/18   Treatment Day 2   Plan   Treatment/Interventions Functional transfer training;LE strengthening/ROM; Therapeutic exercise; Endurance training;Patient/family training;Bed mobility;Gait training;Spoke to nursing   Progress Progressing toward goals   PT Frequency (3-5x/week)   Recommendation   Recommendation (Rehab)   Equipment Recommended Faiza Pham  (TINY)     Josafat Dowd, PTA

## 2018-11-08 LAB
GLUCOSE SERPL-MCNC: 105 MG/DL (ref 65–140)
GLUCOSE SERPL-MCNC: 110 MG/DL (ref 65–140)
GLUCOSE SERPL-MCNC: 128 MG/DL (ref 65–140)
GLUCOSE SERPL-MCNC: 144 MG/DL (ref 65–140)

## 2018-11-08 PROCEDURE — 97530 THERAPEUTIC ACTIVITIES: CPT

## 2018-11-08 PROCEDURE — 82948 REAGENT STRIP/BLOOD GLUCOSE: CPT

## 2018-11-08 PROCEDURE — 92526 ORAL FUNCTION THERAPY: CPT

## 2018-11-08 PROCEDURE — 99232 SBSQ HOSP IP/OBS MODERATE 35: CPT | Performed by: GENERAL PRACTICE

## 2018-11-08 RX ADMIN — METOPROLOL SUCCINATE 50 MG: 50 TABLET, EXTENDED RELEASE ORAL at 21:05

## 2018-11-08 RX ADMIN — LISINOPRIL 20 MG: 20 TABLET ORAL at 21:05

## 2018-11-08 RX ADMIN — ATORVASTATIN CALCIUM 40 MG: 40 TABLET, FILM COATED ORAL at 17:17

## 2018-11-08 RX ADMIN — ACETAMINOPHEN 650 MG: 325 TABLET, FILM COATED ORAL at 05:35

## 2018-11-08 RX ADMIN — PANTOPRAZOLE SODIUM 40 MG: 40 TABLET, DELAYED RELEASE ORAL at 05:31

## 2018-11-08 RX ADMIN — ENOXAPARIN SODIUM 40 MG: 40 INJECTION SUBCUTANEOUS at 08:47

## 2018-11-08 RX ADMIN — DOCUSATE SODIUM 100 MG: 100 CAPSULE, LIQUID FILLED ORAL at 17:17

## 2018-11-08 RX ADMIN — DOCUSATE SODIUM 100 MG: 100 CAPSULE, LIQUID FILLED ORAL at 08:47

## 2018-11-08 NOTE — RESTORATIVE TECHNICIAN NOTE
Restorative Specialist Mobility Note       Activity: Ambulate in room, Bathroom privileges, Chair, Stand at bedside, Dangle (Educated/encouraged pt to ambulate with assistance 3-4 x's/day  Bed alarm on   Pt callbell, phone/tray within reach )     Assistive Device: Front wheel walker       [employer] BS, Restorative Technician, [employer]

## 2018-11-08 NOTE — PHYSICAL THERAPY NOTE
PHYSICAL THERAPY NOTE       11/08/18 1425   Pain Assessment   Pain Assessment No/denies pain   Restrictions/Precautions   Weight Bearing Precautions Per Order No   General   Chart Reviewed Yes   Additional Pertinent History nasal mass,dehydration,h/o falls   Response to Previous Treatment Patient reporting fatigue but able to participate  Family/Caregiver Present No   Cognition   Overall Cognitive Status WFL   Arousal/Participation Alert; Responsive; Cooperative   Attention Within functional limits   Orientation Level Oriented X4   Memory Within functional limits   Following Commands Follows all commands and directions without difficulty   Subjective   Subjective "I'm tired but I've probably been back to bed too long"   Bed Mobility   Supine to Sit 3  Moderate assistance   Additional items Assist x 1;HOB elevated; Bedrails;Verbal cues; Increased time required   Transfers   Sit to Stand 4  Minimal assistance   Additional items Assist x 1;Verbal cues; Increased time required   Stand to Sit 5  Supervision   Additional items Assist x 1; Armrests   Ambulation/Elevation   Gait pattern Narrow BALJIT; Decreased foot clearance; Short stride; Excessively slow   Gait Assistance 4  Minimal assist   Additional items Assist x 1   Assistive Device Rolling walker   Distance 4 steps bed to chair   Balance   Static Sitting Fair +   Dynamic Sitting Fair   Static Standing Fair   Dynamic Standing [id number]  (with RW)   Endurance Deficit   Endurance Deficit Yes   Endurance Deficit Description rest breaks after each activity   Activity Tolerance   Activity Tolerance Patient limited by fatigue   Nurse Made Aware ok to see per Oscar DEAL   Exercises   Hamstring Stretch Sitting;Bilateral;PROM  (30 sec hold x2)   Hip Flexion Sitting;15 reps;Bilateral   Hip Abduction Sitting;15 reps;Bilateral  (manual resistance)   Hip Adduction Sitting;15 reps;Bilateral  (pillow squeezes)   Knee AROM Long Arc Quad 10 reps;Bilateral  (reps limited by fatigue)   Ankle Pumps 20 reps;Bilateral;Sitting   Heel Cord Stretch Sitting;PROM  (30 sec x2 each leg)   Assessment   Prognosis Fair   Problem List Decreased strength;Decreased range of motion;Decreased endurance; Impaired balance;Decreased mobility; Impaired judgement;Pain   Assessment Mr Cortney Paredes participated in PT session this afternoon to focus on bed mobility, balance, transfers and strengthening  He requires assistance for all functional mobility at this time due to weakness, decreased endurance and imbalance  Fatigue was a limiting factor in session, needing rest break after each individual task  He declines ambulation beyond transfer to chair due to fatigue and dizziness in standing  (Pt not standing long enough to obtain vitals)  He is resting in bedside chair with bedside table and call light in reach at end of session  Barriers to Discharge Inaccessible home environment;Decreased caregiver support   Goals   Patient Goals to stop getting headaches   Short Term Goal #1 in 7-10 days:pt will be able to ambulate >100 feet with use of RW on various surfaces without LOB minAx1->S level of A to A pt to return to OF,activity tolerance:45mins/45mins,inc balance 1/2 grade to dec fall risk,inc BLE strength 1/2 grade to A pt to inc balance,strength,endurance and mobility,I with BLE ther ex HEP in various positions to A to inc balance,strength,mobility and endurance,BM and transfers S level of A to A pt to return to [facility]   Plan   Treatment/Interventions Functional transfer training;LE strengthening/ROM; Therapeutic exercise; Endurance training;Patient/family training;Equipment eval/education; Bed mobility;Gait training   Progress Progressing toward goals   PT Frequency (3-5x/wk)   Recommendation   Recommendation Post acute IP rehab   Equipment Recommended Walker   PT - OK to Discharge Yes  (when medically appropriate)   Nguyễn Owens, PT

## 2018-11-08 NOTE — PLAN OF CARE
Problem: PHYSICAL THERAPY ADULT  Goal: Performs mobility at highest level of function for planned discharge setting  See evaluation for individualized goals  Treatment/Interventions: Functional transfer training, LE strengthening/ROM, Therapeutic exercise, Endurance training, Patient/family training, Equipment eval/education, Bed mobility, Gait training, Spoke to nursing, Family  Equipment Recommended: Merlin Sovereign (RW)       See flowsheet documentation for full assessment, interventions and recommendations  Outcome: Progressing  Prognosis: Fair  Problem List: Decreased strength, Decreased range of motion, Decreased endurance, Impaired balance, Decreased mobility, Impaired judgement, Pain  Assessment: Mr Ashwini Ambrocio participated in PT session this afternoon to focus on bed mobility, balance, transfers and strengthening  He requires assistance for all functional mobility at this time due to weakness, decreased endurance and imbalance  Fatigue was a limiting factor in session, needing rest break after each individual task  He declines ambulation beyond transfer to chair due to fatigue and dizziness in standing  (Pt not standing long enough to obtain vitals)  He is resting in bedside chair with bedside table and call light in reach at end of session  Barriers to Discharge: Inaccessible home environment, Decreased caregiver support     Recommendation: Post acute IP rehab     PT - OK to Discharge: Yes (when medically appropriate)    See flowsheet documentation for full assessment

## 2018-11-08 NOTE — UTILIZATION REVIEW
Continued Stay Review    [address]  Utilization Review Department  Phone: [phone]; Fax [phone]  [email or web address]  ATTENTION: Please call with any questions or concerns to [phone]  and carefully listen to the prompts so that you are directed to the right person  Send all requests for admission clinical reviews, approved or denied determinations and any other requests to fax [phone]  All voicemails are confidential     Date:  11/8/2018    Vital Signs: /85   Pulse 67   Temp 98 24 °F (36 8 °C)   Resp (!) 24   Ht 6' 1" (1 854 m)   Wt 88 5 kg (195 lb)   SpO2 95%   BMI 25 73 kg/m²   Date/Time  Temp  Pulse  Resp  BP  SpO2  O2 Device  Patient Position - Orthostatic VS   11/08/18 0811  --  67  --  112/85  95 %  --  Standing - Orthostatic VS   11/08/18 0810  --  75  --  109/57  97 %  --  Sitting - Orthostatic VS   11/08/18 0809  -  77  -  109/57  97%   -  Lying        Medications:   Scheduled Meds:   Current Facility-Administered Medications:  acetaminophen 650 mg Oral Q6H PRN   atorvastatin 40 mg Oral Daily With Dinner   cloNIDine 0 1 mg Oral Q8H PRN   docusate sodium 100 mg Oral BID   enoxaparin 40 mg Subcutaneous Q24H FOUZIA   insulin lispro 1-5 Units Subcutaneous HS   insulin lispro 1-6 Units Subcutaneous TID AC   lisinopril 20 mg Oral HS   metoprolol succinate 50 mg Oral HS   pantoprazole 40 mg Oral Early Morning   polyethylene glycol 17 g Oral Daily PRN   sodium chloride 2 spray Each Nare Q4H PRN     Nursing orders -  VS q 4 - elevate heels off the bed - Turn q 2 - orthostatic BP daily - SCD's to le'-s = I & O q shift - Up with assistance - Abd Binder - Diet dysphagia - mechanical soft - thin liquids - cons carb - PT treatment       Age/Sex: 80 y o  male     Assessment/Plan: 80 y  o  male who presents as a transfer from [facility] with 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

## 2018-11-08 NOTE — ASSESSMENT & PLAN NOTE
- prior to transfer, etiology was presumed to be recent Lasix use for lower extremity edema coupled with mild dehydration  - discontinued Lasix - serial orthostatic checks every morning (ordered)   - cardiology input appreciated who are okay w/ higher baseline BP readings to offset positional drops   - CT angiogram of head/neck NOT showing carotid compression  - echocardiogram reveals an EF of 55% with trace MR/TR but no evidence of LV wall motion abnormalities per reading cardiologist  - cardiology discontinued IV fluids and Norvasc but recommending continuation of Toprol-XL and ACEI at current doses for atrial flutter and hypertension respectively   - Patient still orthostatic 11/6 and symptomatic  Added thigh high compression stockings and Florinef 0 1mg daily  - 11/7: Pt did not get relief from florinef, so d/c  Pt appears more symptomatic from deconditioning > orthostatsis  I have made his BP meds qhs as he is significantly hypertensive supine  Pt encouraged to stand up slowly  Can add abdominal binder if symptomatic  Even if pt orthostatic, acceptable if SBP > 90 when standing  - 11/8: add abdominal binder when standing and make sure TEDS are thigh high when standing  Patient advised to stand slowly

## 2018-11-08 NOTE — ASSESSMENT & PLAN NOTE
· Presented to [facility] with recurrent falls attributed to orthostatic hypotension from diuretic use and dehydration  · PT/OT recommending inpatient rehab  · Referrals made by case management

## 2018-11-08 NOTE — SOCIAL WORK
VM daughter JEANNE, [phone], TCB, states her mother is at [employer] [facility] [email or web address] [facility] and being transferred to [address] [facility]  Family would like parents together  [address] and [address] do not have beds available  Referral to [place] via [facility] completed  Spoke with patient, he is agreeable to going to [address]  Advised that wife will be there as well and they could be in same room if he would like  Spoke with daughter JEANNE, she would prefer they not be together as her mother would disrupt him and he would not get any rest   Advised once they are at [address] if they would like to be together they could work with the facility  She is in contact with her other sisters and providing updates    Patient was agreeable to discussion with his daughters on discharge planning

## 2018-11-08 NOTE — SPEECH THERAPY NOTE
Speech Language/Pathology    Speech/Language Pathology Progress Note    Patient Name: Sahil Wahl  HKZFV'G Date: 11/8/2018    Subjective:  "I just don't have an appetite "  Pt OOB in chair  Awake, alert, conversive  Objective:  Pt seen for f/u dysphagia tx at breakfast   Current diet: dysphagia level 2 with thin liquids  Pt states he is no longer able to wear dentures due to mass pressing down on soft palate  Pt observed eating oatmeal with prompt transfer  Agreeable to slices of banana in oatmeal   Decreased, but seemingly effective mastication of soft banana  No oral residue following transfer  No overt s/s aspiration observed during meal   Pt feels he can manage very soft solids but does not wish to consider a puree diet as he feels this will be too limiting  Not appropriate for trials of advanced textures due to lacking dentition/inability to fit dentures  Assessment:  Pt appears to be on the safest, least restrictive diet at this time  Plan/Recommendations:  Continue dysphagia level 2 diet with thin liquids    D/c speech therapy

## 2018-11-08 NOTE — ASSESSMENT & PLAN NOTE
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

## 2018-11-08 NOTE — PROGRESS NOTES
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 on CT imaging which previously measured 3 3 x 2 8 cm in July 2016  - would ideally need biopsy for definitive diagnosis and treatment plan - appreciate neurosurgery input who recommended ENT consult to proceed with biopsy - discussed with ENT after their discussion with family and plan will be to pursue transnasal approach biopsy in the outpatient setting approximately one week post-discharge  - PRN pain control and supportive care otherwise    - PRN nasal spray for congestion    - speech/swallow evaluation appreciated - now recommending mechanical soft diet with thin liquids       Moderate protein-calorie malnutrition (HCC)   Assessment & Plan    Malnutrition Findings:   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)  Degree of Malnutrition: Malnutrition of moderate degree (evidenced by 20% wt loss x1 month; 9% wt loss x5 months and intake meeting less than 75% estimated needs for greater than 1 month; treatment includes textured controlled meals and glucerna supplement tid)    BMI Findings: Body mass index is 25 73 kg/m²          CAD (coronary artery disease)   Assessment & Plan    - status post prior stenting  - continue Lipitor/Zestril/Toprol-XL        Recurrent falls   Assessment & Plan    · Presented to [facility] with recurrent falls attributed to orthostatic hypotension from diuretic use and dehydration  · PT/OT recommending inpatient rehab  · Referrals made by case management     Essential hypertension   Assessment & Plan    - continue Zestril/Toprol-XL - Norvasc discontinued by cardiology  - as stated above, cardiology is okay with slightly higher baseline resting BP to offset positional drop from orthostasis       Diabetes mellitus type 2   Assessment & Plan    - HbA1c well controlled at 5 8   - on SSI coverage per Accu-Cheks     New onset atrial flutter   Assessment & Plan    - EKGs noted - currently rate controlled on Toprol-XL  - Coumadin will remain held anticipating biopsy within a week post discharge  - recent diagnosis at [facility] prior to transfer  - STR can start coumadin once cleared by ENT  No need for bridging  Orthostatic hypotension   Assessment & Plan    - prior to transfer, etiology was presumed to be recent Lasix use for lower extremity edema coupled with mild dehydration  - discontinued Lasix - serial orthostatic checks every morning (ordered)   - cardiology input appreciated who are okay w/ higher baseline BP readings to offset positional drops   - CT angiogram of head/neck NOT showing carotid compression  - echocardiogram reveals an EF of 55% with trace MR/TR but no evidence of LV wall motion abnormalities per reading cardiologist  - cardiology discontinued IV fluids and Norvasc but recommending continuation of Toprol-XL and ACEI at current doses for atrial flutter and hypertension respectively   - Patient still orthostatic 11/6 and symptomatic  Added thigh high compression stockings and Florinef 0 1mg daily  - 11/7: Pt did not get relief from florinef, so d/c  Pt appears more symptomatic from deconditioning > orthostatsis  I have made his BP meds qhs as he is significantly hypertensive supine  Pt encouraged to stand up slowly  Can add abdominal binder if symptomatic  Even if pt orthostatic, acceptable if SBP > 90 when standing  - 11/8: add abdominal binder when standing and make sure TEDS are thigh high when standing  Patient advised to stand slowly         VTE Pharmacologic Prophylaxis:   Pharmacologic: Enoxaparin (Lovenox)  Mechanical VTE Prophylaxis in Place: Yes    Patient Centered Rounds: I have performed bedside rounds with nursing staff today  Discussions with Specialists or Other Care Team Provider: no    Education and Discussions with Family / Patient: pt  Left VM w/ [facility]    Time Spent for Care: 30 minutes  More than 50% of total time spent on counseling and coordination of care as described above  Current Length of Stay: 7 day(s)    Current Patient Status: Inpatient   Certification Statement: The patient will continue to require additional inpatient hospital stay due to need for STR polacement    Discharge Plan: likely tomorrow to STR    Code Status: Level 1 - Full Code      Subjective:   Dizzy while standing    Objective:     Vitals:   Temp (24hrs), Av 3 °F (36 8 °C), Min:97 7 °F (36 5 °C), Max:99 1 °F (37 3 °C)    Temp:  [97 7 °F (36 5 °C)-99 1 °F (37 3 °C)] 97 7 °F (36 5 °C)  HR:  [57-77] 77  Resp:  [20-24] 20  BP: (109-162)/(53-85) 117/53  SpO2:  [95 %-98 %] 98 %  Body mass index is 25 73 kg/m²  Input and Output Summary (last 24 hours): Intake/Output Summary (Last 24 hours) at 18 1710  Last data filed at 18 1304   Gross per 24 hour   Intake              220 ml   Output              894 ml   Net             -674 ml       Physical Exam:     Physical Exam   Constitutional: He is oriented to person, place, and time  No distress  HENT:   Head: Normocephalic and atraumatic  Eyes: Conjunctivae and EOM are normal    Neck: Normal range of motion  Neck supple  Cardiovascular: Normal rate and regular rhythm  Pulmonary/Chest: Effort normal and breath sounds normal  He has no wheezes  He has no rales  Abdominal: Soft  Bowel sounds are normal  He exhibits no distension  There is no tenderness  Musculoskeletal: Normal range of motion  He exhibits no edema  Neurological: He is alert and oriented to person, place, and time  Skin: Skin is warm and dry  He is not diaphoretic         Additional Data: Labs:      Results from last 7 days  Lab Units 11/06/18  0453   WBC Thousand/uL 4 86   HEMOGLOBIN g/dL 12 2   HEMATOCRIT % 39 4   PLATELETS Thousands/uL 104*   NEUTROS PCT % 69   LYMPHS PCT % 23   MONOS PCT % 7   EOS PCT % 0       Results from last 7 days  Lab Units 11/04/18  0546   POTASSIUM mmol/L 3 7   CHLORIDE mmol/L 104   CO2 mmol/L 27   BUN mg/dL 14   CREATININE mg/dL 0 88   CALCIUM mg/dL 8 2*       Results from last 7 days  Lab Units 11/05/18  0531   INR  1 24*       * I Have Reviewed All Lab Data Listed Above  * Additional Pertinent Lab Tests Reviewed: Janeth 66 Admission Reviewed      Recent Cultures (last 7 days):           Last 24 Hours Medication List:     Current Facility-Administered Medications:  acetaminophen 650 mg Oral Q6H PRN Angel Luis Dodge MD   atorvastatin 40 mg Oral Daily With Leelee Reyna MD   cloNIDine 0 1 mg Oral Q8H PRN Stevie Gaston PA-C   docusate sodium 100 mg Oral BID Latanya Horton MD   enoxaparin 40 mg Subcutaneous Q24H [address] Latanya Horton MD   insulin lispro 1-5 Units Subcutaneous HS Angel Luis Dodge MD   insulin lispro 1-6 Units Subcutaneous TID AC Latanya Horton MD   lisinopril 20 mg Oral HS Mer Eduardo DO   metoprolol succinate 50 mg Oral HS Mer Eduardo DO   pantoprazole 40 mg Oral Early Morning Angel Luis Dodge MD   polyethylene glycol 17 g Oral Daily PRN Latanya Horton MD   sodium chloride 2 spray Each Nare Q4H PRN Latanya Horton MD        Today, Patient Was Seen By: Mer Eduardo DO    ** Please Note: Dictation voice to text software may have been used in the creation of this document   **

## 2018-11-08 NOTE — ASSESSMENT & PLAN NOTE
- EKGs noted - currently rate controlled on Toprol-XL  - Coumadin will remain held anticipating biopsy within a week post discharge  - recent diagnosis at [facility] - [facility] prior to transfer  - STR can start coumadin once cleared by ENT  No need for bridging

## 2018-11-09 PROBLEM — R33.9 URINARY RETENTION: Status: ACTIVE | Noted: 2018-11-09

## 2018-11-09 LAB
GLUCOSE SERPL-MCNC: 105 MG/DL (ref 65–140)
GLUCOSE SERPL-MCNC: 112 MG/DL (ref 65–140)
GLUCOSE SERPL-MCNC: 132 MG/DL (ref 65–140)
GLUCOSE SERPL-MCNC: 143 MG/DL (ref 65–140)

## 2018-11-09 PROCEDURE — 99233 SBSQ HOSP IP/OBS HIGH 50: CPT | Performed by: GENERAL PRACTICE

## 2018-11-09 PROCEDURE — 82948 REAGENT STRIP/BLOOD GLUCOSE: CPT

## 2018-11-09 RX ORDER — LISINOPRIL 10 MG/1
10 TABLET ORAL
Status: DISCONTINUED | OUTPATIENT
Start: 2018-11-09 | End: 2018-11-10 | Stop reason: HOSPADM

## 2018-11-09 RX ORDER — POLYETHYLENE GLYCOL 3350 17 G/17G
17 POWDER, FOR SOLUTION ORAL DAILY
Status: DISCONTINUED | OUTPATIENT
Start: 2018-11-09 | End: 2018-11-10 | Stop reason: HOSPADM

## 2018-11-09 RX ORDER — BISACODYL 10 MG
10 SUPPOSITORY, RECTAL RECTAL ONCE
Status: COMPLETED | OUTPATIENT
Start: 2018-11-09 | End: 2018-11-09

## 2018-11-09 RX ORDER — CLONIDINE HYDROCHLORIDE 0.1 MG/1
0.1 TABLET ORAL EVERY 8 HOURS PRN
Status: DISCONTINUED | OUTPATIENT
Start: 2018-11-09 | End: 2018-11-10 | Stop reason: HOSPADM

## 2018-11-09 RX ADMIN — DOCUSATE SODIUM 100 MG: 100 CAPSULE, LIQUID FILLED ORAL at 17:49

## 2018-11-09 RX ADMIN — ENOXAPARIN SODIUM 40 MG: 40 INJECTION SUBCUTANEOUS at 09:32

## 2018-11-09 RX ADMIN — BISACODYL 10 MG: 10 SUPPOSITORY RECTAL at 06:36

## 2018-11-09 RX ADMIN — ACETAMINOPHEN 650 MG: 325 TABLET, FILM COATED ORAL at 21:42

## 2018-11-09 RX ADMIN — PANTOPRAZOLE SODIUM 40 MG: 40 TABLET, DELAYED RELEASE ORAL at 06:36

## 2018-11-09 RX ADMIN — ATORVASTATIN CALCIUM 40 MG: 40 TABLET, FILM COATED ORAL at 17:48

## 2018-11-09 RX ADMIN — LISINOPRIL 10 MG: 10 TABLET ORAL at 21:42

## 2018-11-09 RX ADMIN — METOPROLOL SUCCINATE 50 MG: 50 TABLET, EXTENDED RELEASE ORAL at 21:40

## 2018-11-09 NOTE — PROGRESS NOTES
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 on CT imaging which previously measured 3 3 x 2 8 cm in July 2016  - would ideally need biopsy for definitive diagnosis and treatment plan - appreciate neurosurgery input who recommended ENT consult to proceed with biopsy - discussed with ENT after their discussion with family and plan will be to pursue transnasal approach biopsy in the outpatient setting approximately one week post-discharge  - PRN pain control and supportive care otherwise    - PRN nasal spray for congestion    - speech/swallow evaluation appreciated - now recommending mechanical soft diet with thin liquids       Urinary retention   Assessment & Plan    tx constipation  Straight cath x 2  If pt tonight has urinary retention, please order [facility] urology consult     Moderate protein-calorie malnutrition ([address] )   Assessment & Plan    Malnutrition Findings:   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)  Degree of Malnutrition: Malnutrition of moderate degree (evidenced by 20% wt loss x1 month; 9% wt loss x5 months and intake meeting less than 75% estimated needs for greater than 1 month; treatment includes textured controlled meals and glucerna supplement tid)    BMI Findings: Body mass index is 25 73 kg/m²          CAD (coronary artery disease)   Assessment & Plan    - status post prior stenting  - continue Lipitor/Zestril/Toprol-XL        Recurrent falls   Assessment & Plan    · [facility]ed to [facility] with recurrent falls attributed to orthostatic hypotension from diuretic use and dehydration  · PT/OT recommending inpatient rehab  · Referrals made by case management     Essential hypertension   Assessment & Plan    - continue Zestril/Toprol-XL - Norvasc discontinued by cardiology  - as stated above, cardiology is okay with slightly higher baseline resting BP to offset positional drop from orthostasis       Diabetes mellitus type 2   Assessment & Plan    - HbA1c well controlled at 5 8   - on SSI coverage per Accu-Cheks     New onset atrial flutter   Assessment & Plan    - EKGs noted - currently rate controlled on Toprol-XL  - Coumadin will remain held anticipating biopsy within a week post discharge  - recent diagnosis at [facility] prior to transfer  - STR can start coumadin once cleared by ENT  No need for bridging  Orthostatic hypotension   Assessment & Plan    - prior to transfer, etiology was presumed to be recent Lasix use for lower extremity edema coupled with mild dehydration  - discontinued Lasix - serial orthostatic checks every morning (ordered)   - cardiology input appreciated who are okay w/ higher baseline BP readings to offset positional drops   - CT angiogram of head/neck NOT showing carotid compression  - echocardiogram reveals an EF of 55% with trace MR/TR but no evidence of LV wall motion abnormalities per reading cardiologist  - cardiology discontinued IV fluids and Norvasc but recommending continuation of Toprol-XL and ACEI at current doses for atrial flutter and hypertension respectively   - Patient still orthostatic 11/6 and symptomatic  Added thigh high compression stockings and Florinef 0 1mg daily  - 11/7: Pt did not get relief from florinef, so d/c  Pt appears more symptomatic from deconditioning > orthostatsis  I have made his BP meds qhs as he is significantly hypertensive supine  Pt encouraged to stand up slowly  Can add abdominal binder if symptomatic    Even if pt orthostatic, acceptable if SBP > 90 when standing  - 11/8: add abdominal binder when standing and make sure TEDS are thigh high when standing  Patient advised to stand slowly  - : decrease lisinopril to 10 mg       VTE Pharmacologic Prophylaxis:   Pharmacologic: Enoxaparin (Lovenox)  Mechanical VTE Prophylaxis in Place: Yes    Patient Centered Rounds: I have performed bedside rounds with nursing staff today  Discussions with Specialists or Other Care Team Provider: no    Education and Discussions with Family / Patient: pt  Left VM w/ Rebecca Harrisburg    Time Spent for Care: 45 minutes  More than 50% of total time spent on counseling and coordination of care as described above  Current Length of Stay: 8 day(s)    Current Patient Status: Inpatient   Certification Statement: The patient will continue to require additional inpatient hospital stay due to need to monitor urinary retention    Discharge Plan: when insurance auth obtained for rehab and will need to monitor urinary retention    Code Status: Level 1 - Full Code      Subjective:   Pt retaining urine    Objective:     Vitals:   Temp (24hrs), Av 1 °F (36 7 °C), Min:97 7 °F (36 5 °C), Max:98 4 °F (36 9 °C)    Temp:  [97 7 °F (36 5 °C)-98 4 °F (36 9 °C)] 98 4 °F (36 9 °C)  HR:  [76-83] 83  Resp:  [18-22] 22  BP: ()/(53-73) 94/54  SpO2:  [95 %-98 %] 95 %  Body mass index is 25 73 kg/m²  Input and Output Summary (last 24 hours):        Intake/Output Summary (Last 24 hours) at 18 1514  Last data filed at 18 0915   Gross per 24 hour   Intake              420 ml   Output              775 ml   Net             -355 ml       Physical Exam:     Physical Exam    Additional Data:     Labs:      Results from last 7 days  Lab Units 18  0453   WBC Thousand/uL 4 86   HEMOGLOBIN g/dL 12 2   HEMATOCRIT % 39 4   PLATELETS Thousands/uL 104*   NEUTROS PCT % 69   LYMPHS PCT % 23   MONOS PCT % 7   EOS PCT % 0       Results from last 7 days  Lab Units 18  0546   POTASSIUM mmol/L 3 7   CHLORIDE mmol/L 104   CO2 mmol/L 27   BUN mg/dL 14 CREATININE mg/dL 0 88   CALCIUM mg/dL 8 2*       Results from last 7 days  Lab Units 11/05/18  0531   INR  1 24*       * I Have Reviewed All Lab Data Listed Above  * Additional Pertinent Lab Tests Reviewed: Janeth Canales Admission Reviewed        Recent Cultures (last 7 days):           Last 24 Hours Medication List:     Current Facility-Administered Medications:  acetaminophen 650 mg Oral Q6H PRN Yoana Gardner MD   atorvastatin 40 mg Oral Daily With Lucy Solis MD   cloNIDine 0 1 mg Oral Q8H PRN Brianne Flores,    docusate sodium 100 mg Oral BID Laurie Brock MD   enoxaparin 40 mg Subcutaneous Q24H [address] Laurie Brock MD   insulin lispro 1-5 Units Subcutaneous HS Yoana Gardner MD   insulin lispro 1-6 Units Subcutaneous TID AC Laurie Brock MD   lisinopril 10 mg Oral HS Dunbar Mixnikki,    metoprolol succinate 50 mg Oral HS Brianne Mixer, DO   pantoprazole 40 mg Oral Early Morning Orjose g Gardner MD   polyethylene glycol 17 g Oral Daily MARCIN Rizvi   sodium chloride 2 spray Each Nare Q4H PRN Laurie Brock MD        Today, Patient Was Seen By: Brianne Flores DO    ** Please Note: Dictation voice to text software may have been used in the creation of this document   **

## 2018-11-09 NOTE — PROGRESS NOTES
RN reported patient having trouble urinating  PVR 900ml  And no BM for 5 days  Ordered straight cath x 1 and Dulcolax supp x 1  Changed Miralax to daily

## 2018-11-09 NOTE — NURSING NOTE
Pt is c/o not being able to urinate  He urinated 50 cc and bladder scanned him for 900  Awaiting SLIM to call back with orders  He stated that he hasn't pooped in 5 days and now he can't pee  He doesn't want any of this to delay his leaving today

## 2018-11-09 NOTE — RESTORATIVE TECHNICIAN NOTE
Restorative Specialist Mobility Note       Activity: Ambulate in reid, Ambulate in room, Bathroom privileges, Chair, Dangle, Stand at bedside (Educated/encouraged pt to ambulate with assistance 3-4 x's/day, pt refused nursing aware  Bed alarm on   Pt callbell, phone/tray within reach )    Sharmila VELASQUEZ, Restorative Technician, [employer]

## 2018-11-09 NOTE — ASSESSMENT & PLAN NOTE
· Presented to [facility] with recurrent falls attributed to orthostatic hypotension from diuretic use and dehydration  · PT/OT recommending inpatient rehab  · Referrals made by case management

## 2018-11-09 NOTE — ASSESSMENT & PLAN NOTE
- prior to transfer, etiology was presumed to be recent Lasix use for lower extremity edema coupled with mild dehydration  - discontinued Lasix - serial orthostatic checks every morning (ordered)   - cardiology input appreciated who are okay w/ higher baseline BP readings to offset positional drops   - CT angiogram of head/neck NOT showing carotid compression  - echocardiogram reveals an EF of 55% with trace MR/TR but no evidence of LV wall motion abnormalities per reading cardiologist  - cardiology discontinued IV fluids and Norvasc but recommending continuation of Toprol-XL and ACEI at current doses for atrial flutter and hypertension respectively   - Patient still orthostatic 11/6 and symptomatic  Added thigh high compression stockings and Florinef 0 1mg daily  - 11/7: Pt did not get relief from florinef, so d/c  Pt appears more symptomatic from deconditioning > orthostatsis  I have made his BP meds qhs as he is significantly hypertensive supine  Pt encouraged to stand up slowly  Can add abdominal binder if symptomatic  Even if pt orthostatic, acceptable if SBP > 90 when standing  - 11/8: add abdominal binder when standing and make sure TEDS are thigh high when standing  Patient advised to stand slowly    - 11/9: decrease lisinopril to 10 mg

## 2018-11-09 NOTE — ASSESSMENT & PLAN NOTE
tx constipation  Straight cath x 2  If pt tonight has urinary retention, please order [facility] urology consult

## 2018-11-09 NOTE — ASSESSMENT & PLAN NOTE
- EKGs noted - currently rate controlled on Toprol-XL  - Coumadin will remain held anticipating biopsy within a week post discharge  - recent diagnosis at [facility] - [facility] prior to transfer  - STR can start coumadin once cleared by ENT  No need for bridging

## 2018-11-09 NOTE — ASSESSMENT & PLAN NOTE
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

## 2018-11-10 VITALS
BODY MASS INDEX: 25.84 KG/M2 | DIASTOLIC BLOOD PRESSURE: 70 MMHG | TEMPERATURE: 99.5 F | WEIGHT: 195 LBS | OXYGEN SATURATION: 99 % | HEIGHT: 73 IN | HEART RATE: 77 BPM | RESPIRATION RATE: 20 BRPM | SYSTOLIC BLOOD PRESSURE: 159 MMHG

## 2018-11-10 PROBLEM — E04.1 THYROID NODULE: Status: ACTIVE | Noted: 2018-11-10

## 2018-11-10 LAB
ANION GAP SERPL CALCULATED.3IONS-SCNC: 6 MMOL/L (ref 4–13)
BUN SERPL-MCNC: 19 MG/DL (ref 5–25)
CALCIUM SERPL-MCNC: 8.5 MG/DL (ref 8.3–10.1)
CHLORIDE SERPL-SCNC: 103 MMOL/L (ref 100–108)
CO2 SERPL-SCNC: 28 MMOL/L (ref 21–32)
CREAT SERPL-MCNC: 1 MG/DL (ref 0.6–1.3)
ERYTHROCYTE [DISTWIDTH] IN BLOOD BY AUTOMATED COUNT: 16.1 % (ref 11.6–15.1)
GFR SERPL CREATININE-BSD FRML MDRD: 69 ML/MIN/1.73SQ M
GLUCOSE SERPL-MCNC: 122 MG/DL (ref 65–140)
GLUCOSE SERPL-MCNC: 125 MG/DL (ref 65–140)
GLUCOSE SERPL-MCNC: 137 MG/DL (ref 65–140)
GLUCOSE SERPL-MCNC: 140 MG/DL (ref 65–140)
HCT VFR BLD AUTO: 36.3 % (ref 36.5–49.3)
HGB BLD-MCNC: 11.2 G/DL (ref 12–17)
MCH RBC QN AUTO: 25.5 PG (ref 26.8–34.3)
MCHC RBC AUTO-ENTMCNC: 30.9 G/DL (ref 31.4–37.4)
MCV RBC AUTO: 83 FL (ref 82–98)
PLATELET # BLD AUTO: 89 THOUSANDS/UL (ref 149–390)
PMV BLD AUTO: 11.3 FL (ref 8.9–12.7)
POTASSIUM SERPL-SCNC: 3.6 MMOL/L (ref 3.5–5.3)
RBC # BLD AUTO: 4.39 MILLION/UL (ref 3.88–5.62)
SODIUM SERPL-SCNC: 137 MMOL/L (ref 136–145)
WBC # BLD AUTO: 5.89 THOUSAND/UL (ref 4.31–10.16)

## 2018-11-10 PROCEDURE — 99239 HOSP IP/OBS DSCHRG MGMT >30: CPT | Performed by: GENERAL PRACTICE

## 2018-11-10 PROCEDURE — 82948 REAGENT STRIP/BLOOD GLUCOSE: CPT

## 2018-11-10 PROCEDURE — 87077 CULTURE AEROBIC IDENTIFY: CPT | Performed by: UROLOGY

## 2018-11-10 PROCEDURE — 87186 SC STD MICRODIL/AGAR DIL: CPT | Performed by: UROLOGY

## 2018-11-10 PROCEDURE — 87086 URINE CULTURE/COLONY COUNT: CPT | Performed by: UROLOGY

## 2018-11-10 PROCEDURE — 0T9B70Z DRAINAGE OF BLADDER WITH DRAINAGE DEVICE, VIA NATURAL OR ARTIFICIAL OPENING: ICD-10-PCS | Performed by: UROLOGY

## 2018-11-10 PROCEDURE — 80048 BASIC METABOLIC PNL TOTAL CA: CPT | Performed by: GENERAL PRACTICE

## 2018-11-10 PROCEDURE — 85027 COMPLETE CBC AUTOMATED: CPT | Performed by: GENERAL PRACTICE

## 2018-11-10 PROCEDURE — 99222 1ST HOSP IP/OBS MODERATE 55: CPT | Performed by: UROLOGY

## 2018-11-10 RX ORDER — POTASSIUM CHLORIDE 20 MEQ/1
20 TABLET, EXTENDED RELEASE ORAL DAILY
Status: DISCONTINUED | OUTPATIENT
Start: 2018-11-10 | End: 2018-11-10 | Stop reason: HOSPADM

## 2018-11-10 RX ORDER — TAMSULOSIN HYDROCHLORIDE 0.4 MG/1
0.4 CAPSULE ORAL
Refills: 0
Start: 2018-11-10

## 2018-11-10 RX ORDER — TAMSULOSIN HYDROCHLORIDE 0.4 MG/1
0.4 CAPSULE ORAL
Status: DISCONTINUED | OUTPATIENT
Start: 2018-11-10 | End: 2018-11-10 | Stop reason: HOSPADM

## 2018-11-10 RX ORDER — ACETAMINOPHEN 325 MG/1
650 TABLET ORAL EVERY 6 HOURS PRN
Qty: 30 TABLET | Refills: 0
Start: 2018-11-10

## 2018-11-10 RX ORDER — METOPROLOL SUCCINATE 50 MG/1
50 TABLET, EXTENDED RELEASE ORAL
Refills: 0
Start: 2018-11-10

## 2018-11-10 RX ORDER — WARFARIN SODIUM 5 MG/1
5 TABLET ORAL
Refills: 0
Start: 2018-11-10

## 2018-11-10 RX ORDER — POLYETHYLENE GLYCOL 3350 17 G/17G
17 POWDER, FOR SOLUTION ORAL DAILY
Qty: 14 EACH | Refills: 0
Start: 2018-11-11

## 2018-11-10 RX ORDER — LISINOPRIL 10 MG/1
10 TABLET ORAL
Refills: 0
Start: 2018-11-10

## 2018-11-10 RX ORDER — ECHINACEA PURPUREA EXTRACT 125 MG
2 TABLET ORAL EVERY 4 HOURS PRN
Qty: 30 ML | Refills: 0
Start: 2018-11-10

## 2018-11-10 RX ORDER — POTASSIUM CHLORIDE 20 MEQ/1
20 TABLET, EXTENDED RELEASE ORAL DAILY
Refills: 0
Start: 2018-11-11

## 2018-11-10 RX ORDER — DOCUSATE SODIUM 100 MG/1
100 CAPSULE, LIQUID FILLED ORAL 2 TIMES DAILY
Qty: 10 CAPSULE | Refills: 0
Start: 2018-11-10

## 2018-11-10 RX ADMIN — DOCUSATE SODIUM 100 MG: 100 CAPSULE, LIQUID FILLED ORAL at 09:37

## 2018-11-10 RX ADMIN — PANTOPRAZOLE SODIUM 40 MG: 40 TABLET, DELAYED RELEASE ORAL at 05:27

## 2018-11-10 RX ADMIN — POTASSIUM CHLORIDE 20 MEQ: 1500 TABLET, EXTENDED RELEASE ORAL at 09:37

## 2018-11-10 RX ADMIN — ENOXAPARIN SODIUM 40 MG: 40 INJECTION SUBCUTANEOUS at 09:36

## 2018-11-10 RX ADMIN — ATORVASTATIN CALCIUM 40 MG: 40 TABLET, FILM COATED ORAL at 17:52

## 2018-11-10 RX ADMIN — TAMSULOSIN HYDROCHLORIDE 0.4 MG: 0.4 CAPSULE ORAL at 17:52

## 2018-11-10 RX ADMIN — DOCUSATE SODIUM 100 MG: 100 CAPSULE, LIQUID FILLED ORAL at 17:52

## 2018-11-10 RX ADMIN — ACETAMINOPHEN 650 MG: 325 TABLET, FILM COATED ORAL at 09:36

## 2018-11-10 RX ADMIN — POLYETHYLENE GLYCOL 3350 17 G: 17 POWDER, FOR SOLUTION ORAL at 09:36

## 2018-11-10 NOTE — PROGRESS NOTES
Unable to get urine to return from [facility]  Message sent to [facility] provider and suggested this patient may need a coude catheter  Patient tolerated procedure fair

## 2018-11-10 NOTE — PROGRESS NOTES
Called by RN to start difficult Rojas  Patient was straight-cathed x 3, and now Rojas will not pass  After sterile prep, 18Fr Coude catheter was inserted, meeting some moderate resistance at the cysto-urethral junction (patient had previous prostatectomy)  Clear yellow urine return  Patient tolerated procedure well

## 2018-11-10 NOTE — ASSESSMENT & PLAN NOTE
tx constipation  Failed urinary retention protocol, so [facility] attempted but unable to be placed, so coude placed by urology  Urology consult appreciated - as pt going to STR today, will leave coude in until re-eval in outpatient setting by urology    Flomax added  U Cx ordered by urology, but would not tx as pt has no symptoms of UTI

## 2018-11-10 NOTE — SOCIAL WORK
CM spoke with [facility] @ [address] [place] [address] [phone]  Auth received and facility able to accept today  [facility] transport scheduled via [facility] set for 7:00pm pickup  Pt's primary contact Earlene Cai and floor nurse updated re: same  Pt's med list requested by facility  Advised Floor nurse re: same  Contact numbers for report provided to nurse  Report: [phone]; fax [phone]

## 2018-11-10 NOTE — DISCHARGE SUMMARY
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 5 5 cm on CT imaging which previously measured 3 3 x 2 8 cm in July 2016  - would ideally need biopsy for definitive diagnosis and treatment plan - appreciate neurosurgery input who recommended ENT consult to proceed with biopsy - discussed with ENT after their discussion with family and plan will be to pursue transnasal approach biopsy in the outpatient setting approximately one week post-discharge  - PRN pain control and supportive care otherwise    - PRN nasal spray for congestion    - speech/swallow evaluation appreciated - now recommending mechanical soft diet with thin liquids       Thyroid nodule   Assessment & Plan    Outpt nonurgent US when thyroid completed     Urinary retention   Assessment & Plan    tx constipation  Failed urinary retention protocol, so rosenberg attempted but unable to be placed, so coude placed by urology  Urology consult appreciated - as pt going to STR today, will leave coude in until re-eval in outpatient setting by urology  Flomax added  U Cx ordered by urology, but would not tx as pt has no symptoms of UTI     Moderate protein-calorie malnutrition ([address] )   Assessment & Plan    Malnutrition Findings:   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)  Degree of Malnutrition: Malnutrition of moderate degree (evidenced by 20% wt loss x1 month; 9% wt loss x5 months and intake meeting less than 75% estimated needs for greater than 1 month; treatment includes textured controlled meals and glucerna supplement tid)    BMI Findings: Body mass index is 25 73 kg/m²          CAD (coronary artery disease)   Assessment & Plan    - status post prior stenting  - continue Lipitor/Zestril/Toprol-XL        Recurrent falls   Assessment & Plan    · Presented to [facility] with recurrent falls attributed to orthostatic hypotension from diuretic use and dehydration  · PT/OT recommending inpatient rehab  · Referrals made by case management     Essential hypertension   Assessment & Plan    - continue Zestril/Toprol-XL - Norvasc discontinued by cardiology  - as stated above, cardiology is okay with slightly higher baseline resting BP to offset positional drop from orthostasis       Diabetes mellitus type 2   Assessment & Plan    - HbA1c well controlled at 5 8   - on SSI coverage per [employer]     New onset atrial flutter   Assessment & Plan    - EKGs noted - currently rate controlled on Toprol-XL  - Coumadin will remain held anticipating biopsy within a week post discharge  - recent diagnosis at [facility] prior to transfer  - STR can start coumadin once cleared by ENT  No need for bridging  Orthostatic hypotension   Assessment & Plan    - prior to transfer, etiology was presumed to be recent Lasix use for lower extremity edema coupled with mild dehydration  - discontinued Lasix - serial orthostatic checks every morning (ordered)   - cardiology input appreciated who are okay w/ higher baseline BP readings to offset positional drops   - CT angiogram of head/neck NOT showing carotid compression  - echocardiogram reveals an EF of 55% with trace MR/TR but no evidence of LV wall motion abnormalities per reading cardiologist  - cardiology discontinued IV fluids and Norvasc but recommending continuation of Toprol-XL and ACEI at current doses for atrial flutter and hypertension respectively   - Patient still orthostatic 11/6 and symptomatic  Added thigh high compression stockings and Florinef 0 1mg daily  - 11/7: Pt did not get relief from florinef, so d/c    Pt appears more symptomatic from deconditioning > orthostatsis  I have made his BP meds qhs as he is significantly hypertensive supine  Pt encouraged to stand up slowly  Can add abdominal binder if symptomatic  Even if pt orthostatic, acceptable if SBP > 90 when standing  - 11/8: add abdominal binder when standing and make sure TEDS are thigh high when standing  Patient advised to stand slowly  - 11/9: decrease lisinopril to 10 mg       Discharging Physician / Practitioner: Aline Nazario DO  PCP: Nilam Arriola DO  Admission Date:   Admission Orders     Ordered        11/01/18 2103  Inpatient Admission  Once             Discharge Date: 11/10/18    Disposition:      Short Term Rehab or SNF at [facility]    For Discharges to [facility] SNF:   · Not Applicable to this Patient - Not Applicable to this Patient    Reason for Admission: transferred from [facility] for nasopharyngeal mass w/ need for neurosurg eval    Discharge Diagnoses:     Please see assessment and plan section above for further details regarding discharge diagnoses  Resolved Problems  Date Reviewed: 11/10/2018    None          Consultations During Hospital Stay:  · Dr Asher Mackey - nsurg  · Dr Jordan Richardson - cardio  · Dr Levon Agrawal - ENT  · Dr Acacia Gustafson - urology    Procedures Performed:     · Coude catheter placed by urology     Medication Adjustments and Discharge Medications:  · Summary of Medication Adjustments made as a result of this hospitalization: Norvasc stopped, ACEi decreased, ACEi and BB will be given at night  · Medication Dosing Tapers - Please refer to Discharge Medication List for details on any medication dosing tapers (if applicable to patient)  · Medications being temporarily held (include recommended restart time): Please restart coumadin after biopsy when ok by ENT  · Discharge Medication List: See after visit summary for reconciled discharge medications       Wound Care Recommendations:  When applicable, please see wound care section of After Visit Summary  Diet Recommendations at Discharge:  Diet -        Diet Orders            Start     Ordered    11/08/18 1508  Diet Dysphagia/Modified Consistency; Dysphagia 2-Mechanical Soft; Thin Liquid; Sodium 4 GM (REMI)  Diet effective now     Question Answer Comment   Diet Type Dysphagia/Modified Consistency    Dysphagia/Modified Consistency Dysphagia 2-Mechanical Soft    Liquid Modifier Thin Liquid    Other Restriction(s): Sodium 4 GM (REMI)    RD to adjust diet per protocol? Yes        11/08/18 1507          Instructions for any Catheters / Lines Present at Discharge (including removal date, if applicable): Please leave coude catheter in until pt follows up w/ urology in 2-4 weeks    Significant Findings / Test Results:     CTA head and neck w wo contrast   Final Result by Damián Basilio MD (11/05 1600)      1  Redemonstration of large soft tissue mass centered at the left nasopharyngeal wall with multiregional extension to the oropharynx, left , carotid , and left parapharyngeal spaces as described  The mass encases distal left cervical internal    carotid artery without significant narrowing  None of left internal jugular vein throughout its course in the neck  2   The remainder of the major cervical and intracranial arteries are patent  Moderate presumably atherosclerotic narrowing of mid M1 segment of left MCA  No aneurysm  3   A 1 cm left level 2A lymph node  4   Partially seen right upper lobe nodular opacities, new from 8/30/2018, concerning for infection  Also mildly prominent mediastinal lymph nodes in visualized upper chest       5   Left thyroid lobe 1 1 cm nodule  According to guidelines published in the February 2015 white paper on incidental thyroid nodules, recommend dedicated ultrasound for further evaluation            I personally discussed this study with Celina Palmer on 11/5/2018 at 3:48 PM                      Workstation performed: [id number] Incidental Findings:   · Thyroid nodule     Test Results Pending at Discharge (will require follow up):   · U Cx - would not tx if positive as pt has no symptoms of UTI  Outpatient Tests Requested:  · Pt will go to ENT this coming week for nasopharyngeal biopsy - please call to schedule  · Check BMP in 3-5 days  · Non-urgent thyroid US once rehab complete    Complications:  none    Hospital Course:     Sherryle Hertz is a 80 y o  male patient who originally presented to the hospital on 11/1/2018 due to a nasopharyngeal mass for nsurg eval   Nsurg eval   Nsurg rec ENT consult  ENT will do bx outpatient in their office  For orthostatic hypotension, BP WNL when standing  Pt found to have high BPs when lying down, so will give BP meds at night  Tolerate -160  Given TEDs and abdominal binder for patient to wear when he stands  Condition at Discharge: fair     Discharge Day Visit / Exam:     Subjective:  Pt feeling better  Vitals: Blood Pressure: 159/70 (11/10/18 1514)  Pulse: 77 (11/10/18 1514)  Temperature: 99 5 °F (37 5 °C) (11/10/18 1514)  Temp Source: Oral (11/09/18 2300)  Respirations: 20 (11/10/18 0739)  Height: 6' 1" (185 4 cm) (11/01/18 2053)  Weight - Scale: 88 5 kg (195 lb) (11/01/18 2053)  SpO2: 99 % (11/10/18 1514)  Exam:   Physical Exam   Constitutional: He is oriented to person, place, and time  No distress  HENT:   Head: Normocephalic and atraumatic  Eyes: Conjunctivae and EOM are normal    Neck: Normal range of motion  Neck supple  Cardiovascular: Normal rate and regular rhythm  Pulmonary/Chest: Effort normal and breath sounds normal  He has no wheezes  He has no rales  Abdominal: Soft  Bowel sounds are normal  He exhibits no distension  There is no tenderness  Musculoskeletal: Normal range of motion  He exhibits no edema  Neurological: He is alert and oriented to person, place, and time  Skin: Skin is warm and dry  He is not diaphoretic         Discussion with Family: yes    Goals of Care Discussions:  · Code Status at Discharge: Level 1 - Full Code  · Were there any Goals of Care Discussions during Hospitalization?: No  · Results of any General Goals of Care Discussions: n/a   · POLST Completed: No   · If POLST Completed, Summary of POLST Agreement Provided Here: n/a   · OK to Rehospitalize if Needed? Yes    Discharge instructions/Information to patient and family:   See after visit summary section titled Discharge Instructions for information provided to patient and family  Planned Readmission: Based on results of nasopharyngeal mass bx, pt may need readmission for resection      Discharge Statement:  I spent 40 minutes discharging the patient  This time was spent on the day of discharge  I had direct contact with the patient on the day of discharge  Greater than 50% of the total time was spent examining patient, answering all patient questions, arranging and discussing plan of care with patient as well as directly providing post-discharge instructions  Additional time then spent on discharge activities      ** Please Note: This note has been constructed using a voice recognition system **

## 2018-11-10 NOTE — ASSESSMENT & PLAN NOTE
- transferred from 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

## 2018-11-10 NOTE — ASSESSMENT & PLAN NOTE
· Presented to [facility] with recurrent falls attributed to orthostatic hypotension from diuretic use and dehydration  · PT/OT recommending inpatient rehab  · Referrals made by case management

## 2018-11-10 NOTE — ASSESSMENT & PLAN NOTE
- EKGs noted - currently rate controlled on Toprol-XL  - Coumadin will remain held anticipating biopsy within a week post discharge  - recent diagnosis at [facility] prior to transfer  - STR can start coumadin once cleared by ENT  No need for bridging

## 2018-11-10 NOTE — DISCHARGE INSTRUCTIONS
Keep coude catheter in place until you are evaluatwed by Dr Marla Hearn in 2-4 weeks  Wear thigh high TEDs and abdominal binder when standing  Restart coumadin when OK by Dr Von Valle after biopsy  Check BMP in 3-5 days    Please check BPs sitting - do not check in supine position    When rehab completed, you can get a non-urgent thyroid ultrasound        Urinary Retention in Men   WHAT YOU NEED TO KNOW:   Urinary retention is a condition that develops when your bladder does not empty completely when you urinate  DISCHARGE INSTRUCTIONS:   Medicines:   · Medicines  can help decrease the size of your prostate, fight infection, and help you urinate more easily  · Take your medicine as directed  Contact your healthcare provider if you think your medicine is not helping or if you have side effects  Tell him or her if you are allergic to any medicine  Keep a list of the medicines, vitamins, and herbs you take  Include the amounts, and when and why you take them  Bring the list or the pill bottles to follow-up visits  Carry your medicine list with you in case of an emergency  Rojas catheter care: You may need a Rojas catheter for up to 2 weeks at home  Healthcare providers will give you a smaller leg bag to collect urine  Keep the bag below your waist  This will prevent urine from flowing back into your bladder and causing an infection or other problems  Also, keep the tube free of kinks so the urine will drain properly  Do not pull on the catheter  This can cause pain and bleeding, and may cause the catheter to come out  Ask your healthcare provider or urologist for more information on Rojas catheter care  Urinate regularly:  When your catheter is removed, do not let your bladder become too full before you urinate  Set regular times each day to urinate  Urinate as soon as you feel the need or at least every 3 hours while you are awake  Do not drink liquids before you go to bed   Urinate right before you go to bed  Follow up with your healthcare provider or urologist as directed:  Write down your questions so you remember to ask them during your visits  Contact your healthcare provider or urologist if:   · You have a fever  · You have pain when you urinate  · You have blood in your urine  · You have problems with your catheter  · You have questions or concerns about your condition or care  Return to the emergency department if:   · You have severe abdominal pain  · You are breathing faster than usual     · Your heartbeat is faster than usual     · Your face, hands, feet, or ankles are swollen  © 2017 [facility] Information is for End User's use only and may not be sold, redistributed or otherwise used for commercial purposes  All illustrations and images included in CareNotes® are the copyrighted property of [employer]  or Silvestre Sultana  The above information is an  only  It is not intended as medical advice for individual conditions or treatments  Talk to your doctor, nurse or pharmacist before following any medical regimen to see if it is safe and effective for you

## 2018-11-11 NOTE — NURSING NOTE
Patient left with two EMS attendents  Alert and oriented  Dentures and glasses went with patient  Happy to be going to nursing home where his wife is a resident  Patient has no iv  Number 18 coude patent draining clear yellow urine intact

## 2018-11-12 ENCOUNTER — TELEPHONE (OUTPATIENT)
Dept: OTHER | Facility: HOSPITAL | Age: 83
End: 2018-11-12

## 2018-11-12 LAB — BACTERIA UR CULT: ABNORMAL

## 2018-11-12 NOTE — TELEPHONE ENCOUNTER
It looks like pt is at [facility], Dr Lashaun Stallworth note states that UC was ordered by urology, but will not treat, as pt is asymptomatic

## 2018-11-13 ENCOUNTER — DOCUMENTATION (OUTPATIENT)
Dept: NEUROSURGERY | Facility: CLINIC | Age: 83
End: 2018-11-13

## 2018-11-13 PROCEDURE — 88313 SPECIAL STAINS GROUP 2: CPT | Performed by: PATHOLOGY

## 2018-11-13 PROCEDURE — 88342 IMHCHEM/IMCYTCHM 1ST ANTB: CPT | Performed by: PATHOLOGY

## 2018-11-13 PROCEDURE — 88365 INSITU HYBRIDIZATION (FISH): CPT | Performed by: PATHOLOGY

## 2018-11-13 PROCEDURE — 88360 TUMOR IMMUNOHISTOCHEM/MANUAL: CPT | Performed by: PATHOLOGY

## 2018-11-13 PROCEDURE — 88305 TISSUE EXAM BY PATHOLOGIST: CPT | Performed by: PATHOLOGY

## 2018-11-13 NOTE — TELEPHONE ENCOUNTER
No, I think the patient has been discharged  Find out where he is and please make sure he gets treated by 1 of his doctors

## 2018-11-13 NOTE — TELEPHONE ENCOUNTER
Looked at Pt's AVS and saw he was discharged to [facility] facility  Spoke to [place] and she confirmed pt is at facility and stated we need to send paper script and copy of the culture to her at [phone]

## 2018-11-13 NOTE — TELEPHONE ENCOUNTER
I called Dr David Stephens to order  Cipro for this patients UTI and then we can send the printed copy and u/c to the nursing home

## 2018-11-13 NOTE — TELEPHONE ENCOUNTER
I called Dr Tevin Gutierrez office his PCP, Left message ASAP,  with this office because no one was picking up the phone  We need to know where this patient is because he has an UTI that needs to be treated either by us or his treating doctor  They are to call us back

## 2018-11-14 ENCOUNTER — LAB REQUISITION (OUTPATIENT)
Dept: LAB | Facility: HOSPITAL | Age: 83
End: 2018-11-14
Payer: COMMERCIAL

## 2018-11-14 DIAGNOSIS — N30.00 ACUTE CYSTITIS WITHOUT HEMATURIA: Primary | ICD-10-CM

## 2018-11-14 DIAGNOSIS — R22.0 LOCALIZED SWELLING, MASS, AND LUMP OF HEAD: ICD-10-CM

## 2018-11-14 PROCEDURE — 88185 FLOWCYTOMETRY/TC ADD-ON: CPT | Performed by: OTOLARYNGOLOGY

## 2018-11-14 PROCEDURE — 88184 FLOWCYTOMETRY/ TC 1 MARKER: CPT | Performed by: OTOLARYNGOLOGY

## 2018-11-14 RX ORDER — CIPROFLOXACIN 250 MG/1
250 TABLET, FILM COATED ORAL EVERY 12 HOURS SCHEDULED
Qty: 14 TABLET | Refills: 0 | Status: SHIPPED | OUTPATIENT
Start: 2018-11-14 | End: 2018-11-21

## 2018-11-14 NOTE — TELEPHONE ENCOUNTER
Script for Cipro  and u/c report faxed to [facility]   Fax [phone] and phone [phone]  done

## 2018-11-15 ENCOUNTER — TELEPHONE (OUTPATIENT)
Dept: UROLOGY | Facility: CLINIC | Age: 83
End: 2018-11-15

## 2018-11-15 NOTE — TELEPHONE ENCOUNTER
Called and left message for Fabricio Scott at [facility] to call back and schedule appointment  Fabricio Scott is to call the office back  Office number was left in the message

## 2018-11-15 NOTE — TELEPHONE ENCOUNTER
Tabatha Kline called back  Spoke with her on the phone  Patient was schedule for PVR on 11/30 at 2:30 at the [facility] office  Tabatha Kline reports that staff there would be able to remove catheter       Faxed over orders for rosenberg removal to [phone]

## 2018-11-15 NOTE — TELEPHONE ENCOUNTER
Nursing facility wants to schedule hospital follow up for possible void trial only in [facility]  Please advise and call

## 2018-11-15 NOTE — TELEPHONE ENCOUNTER
Should have voiding trial the office in 1-2 weeks with RN visit  If greater than 250 mL, recommend reinserting Rojas catheter and scheduling cystoscopy for further evaluation of lower urinary tract  If less than 250 mL, recommend scheduling follow-up appointment with AP in 2-3 months with uroflow PVR

## 2018-11-15 NOTE — TELEPHONE ENCOUNTER
It looks like patient was consulted in the hospital by Dr Sujit Elias for urinary rentention secondary to administration of narcotics and constipation   Patient has a history of prostatectomy in the past      When should patient be set up for void trial and what would the plan be for follow up if he passes and is he fails

## 2018-11-16 LAB — SCAN RESULT: NORMAL

## 2018-11-27 ENCOUNTER — TRANSCRIBE ORDERS (OUTPATIENT)
Dept: ADMINISTRATIVE | Facility: HOSPITAL | Age: 83
End: 2018-11-27

## 2018-11-27 DIAGNOSIS — C11.9 MALIGNANT NEOPLASM OF NASOPHARYNGEAL WALL (HCC): Primary | ICD-10-CM

## 2018-11-30 ENCOUNTER — PROCEDURE VISIT (OUTPATIENT)
Dept: UROLOGY | Facility: CLINIC | Age: 83
End: 2018-11-30
Payer: COMMERCIAL

## 2018-11-30 VITALS
WEIGHT: 179 LBS | HEART RATE: 86 BPM | SYSTOLIC BLOOD PRESSURE: 108 MMHG | DIASTOLIC BLOOD PRESSURE: 56 MMHG | BODY MASS INDEX: 23.72 KG/M2 | HEIGHT: 73 IN

## 2018-11-30 DIAGNOSIS — R33.9 URINARY RETENTION: Primary | ICD-10-CM

## 2018-11-30 PROCEDURE — A4346 CATH INDW FOLEY 3 WAY: HCPCS | Performed by: UROLOGY

## 2018-11-30 PROCEDURE — 51702 INSERT TEMP BLADDER CATH: CPT

## 2018-11-30 RX ORDER — BISACODYL 10 MG
10 SUPPOSITORY, RECTAL RECTAL DAILY
COMMUNITY

## 2018-11-30 RX ORDER — ESCITALOPRAM OXALATE 10 MG/1
5 TABLET ORAL DAILY
COMMUNITY

## 2018-11-30 NOTE — PROGRESS NOTES
11/30/2018  Saurabh Elias is a 80 y o  male  [id number]    Diagnosis:  Chief Complaint     Urinary Retention          Patient presents for hospital follow up for void trial change managed by Dr Foster Diss:  Patient is to follow up as schedule for cysto with MD  Nursing home is to change catheter every four weeks until scheduled cysto on 1/10/18  Patient instructed to call with any questions or concerns in the meantime  Vitals:    11/30/18 1447   BP: 108/56   BP Location: Left arm   Patient Position: Sitting   Cuff Size: Standard   Pulse: 86   Weight: 81 2 kg (179 lb)   Height: 6' 1" (1 854 m)           Procedure:    Patient had catheter removed in nursing home at 8:00am  Nursing home reported that patient did not void at all  Patient denies any urge to void and does not feel like he could urinate  Patient did report drinking some water today  Bladder catheterization  Date/Time: 11/30/2018 3:27 PM  Performed by: Telma Sherman  Authorized by: Tk Nogueira     Pre-procedure details:     Procedure purpose:  Therapeutic  Anesthesia (see MAR for exact dosages): Anesthesia method:  Topical application    Topical anesthetic:  Lidocaine gel  Procedure details:     Bladder irrigation: no      Catheter insertion:  Indwelling    Approach: natural orifice      Catheter type:  Coude, Rojas and latex    Catheter size:  16 Fr    Number of attempts:  1    Successful placement: yes      Urine characteristics:  Clear (yellow)  Post-procedure details:     Patient tolerance of procedure: Tolerated well, no immediate complications    Drained patients bladder for 250ml of clear yellow urine  Patient denies any immediate complications         Isa Cunningham RN

## 2018-11-30 NOTE — PATIENT INSTRUCTIONS
Urinary Retention in Men   WHAT YOU NEED TO KNOW:   What is urinary retention? Urinary retention is a condition that develops when your bladder does not empty completely when you urinate  What causes urinary retention? · An enlarged prostate    · Blockages, such as a stone, growth, or narrowing of your urethra    · A weak bladder muscle    · Nerve damage from diabetes, stroke, or spinal cord injury    · Bladder diverticula, which are pockets of urine that form in your bladder and do not empty    · Certain medicines, such as narcotics, antihistamines, or antidepressants  What are the signs and symptoms of urinary retention? · Frequent urination, or the urge to urinate right after you finish    · An urge to urinate, but your urine does not come out or dribbles out slowly and weakly    · Frequent urine leaks that happen during the day or while you sleep    · Pain or pressure when you urinate    · Pain or stiffness in your abdomen, lower back, hips, or upper thighs    · Blood in your urine  How is urinary retention diagnosed? Your healthcare provider will ask about your health history and the medicines you take  He will press or tap on your lower abdomen  You may need any of the following tests:  · A digital rectal exam  is when healthcare providers carefully feel the size of your prostate  · A post void residual  test will show how much urine is left in your bladder after you urinate  You will be asked to urinate and then healthcare providers will use a small ultrasound machine to check how much urine is left in your bladder  · Blood or urine tests  may show infection or prostate specific antigen (PSA) levels  PSA may be elevated in prostate cancer  · An ultrasound  uses sound waves to show pictures on a monitor  An ultrasound may be done to show bladder stones, infection, or other problems  · A CT scan , or CAT scan, is a type of x-ray that is taken of your prostate, kidneys, and bladder   The pictures may show what is causing your urinary retention  You may be given a dye before the pictures are taken to help healthcare providers see the pictures better  Tell the healthcare provider if you have ever had an allergic reaction to contrast dye  How is urinary retention treated? · A Rojas catheter  is a tube put into your bladder to drain urine into a bag  Keep the bag below your waist  This will prevent urine from flowing back into your bladder and causing an infection or other problems  Also, keep the tube free of kinks so the urine will drain properly  Do not pull on the catheter  This can cause pain and bleeding, and may cause the catheter to come out  · Medicines  can help decrease the size of your prostate, fight infection, and help you urinate more easily  · Surgery  may be needed to treat the condition that is causing your urinary retention  When should I contact my healthcare provider? · You have a fever  · You have pain when you urinate  · You have blood in your urine  · You have problems with your catheter  · You have questions or concerns about your condition or care  When should I seek immediate care or call 911? · You have severe abdominal pain  · You are breathing faster than usual     · Your heartbeat is faster than usual     · Your face, hands, feet, or ankles are swollen  CARE AGREEMENT:   You have the right to help plan your care  Learn about your health condition and how it may be treated  Discuss treatment options with your caregivers to decide what care you want to receive  You always have the right to refuse treatment  The above information is an  only  It is not intended as medical advice for individual conditions or treatments  Talk to your doctor, nurse or pharmacist before following any medical regimen to see if it is safe and effective for you    © 2017 [employer] Information is for End User's use only and may not be sold, redistributed or otherwise used for commercial purposes  All illustrations and images included in CareNotes® are the copyrighted property of [employer]  or [employer]

## 2018-12-05 PROBLEM — C11.9 NASOPHARYNGEAL CANCER (HCC): Status: ACTIVE | Noted: 2018-12-05

## 2018-12-06 ENCOUNTER — HOSPITAL ENCOUNTER (OUTPATIENT)
Dept: RADIOLOGY | Age: 83
Discharge: HOME | End: 2018-12-06
Payer: COMMERCIAL

## 2018-12-06 DIAGNOSIS — C11.9 MALIGNANT NEOPLASM OF NASOPHARYNGEAL WALL (HCC): ICD-10-CM

## 2018-12-06 DIAGNOSIS — J39.2 MASS OF NASOPHARYNX: ICD-10-CM

## 2018-12-06 DIAGNOSIS — J39.2 MASS OF NASOPHARYNX: Primary | ICD-10-CM

## 2018-12-06 LAB — GLUCOSE SERPL-MCNC: 119 MG/DL (ref 65–140)

## 2018-12-06 PROCEDURE — 78815 PET IMAGE W/CT SKULL-THIGH: CPT

## 2018-12-06 PROCEDURE — A9552 F18 FDG: HCPCS

## 2018-12-06 PROCEDURE — 82948 REAGENT STRIP/BLOOD GLUCOSE: CPT

## 2018-12-07 ENCOUNTER — CLINICAL SUPPORT (OUTPATIENT)
Dept: RADIATION ONCOLOGY | Facility: HOSPITAL | Age: 83
End: 2018-12-07
Payer: COMMERCIAL

## 2018-12-07 ENCOUNTER — RADIATION ONCOLOGY CONSULT (OUTPATIENT)
Dept: RADIATION ONCOLOGY | Facility: HOSPITAL | Age: 83
End: 2018-12-07
Attending: RADIOLOGY
Payer: COMMERCIAL

## 2018-12-07 VITALS
OXYGEN SATURATION: 100 % | DIASTOLIC BLOOD PRESSURE: 54 MMHG | TEMPERATURE: 97.2 F | SYSTOLIC BLOOD PRESSURE: 110 MMHG | HEIGHT: 73 IN | HEART RATE: 80 BPM | BODY MASS INDEX: 23.62 KG/M2 | RESPIRATION RATE: 16 BRPM

## 2018-12-07 DIAGNOSIS — C11.9 NASOPHARYNGEAL CANCER (HCC): Primary | ICD-10-CM

## 2018-12-07 PROCEDURE — 99215 OFFICE O/P EST HI 40 MIN: CPT | Performed by: RADIOLOGY

## 2018-12-07 NOTE — PROGRESS NOTES
Consultation - Radiation Oncology     [facility] [place][id number] [place] 1934  Encounter: [id number]  Patient Information: [address]  Chief Complaint   Patient presents with    Consult     nasopharyngeal cancer     Cancer Staging  Nasopharyngeal cancer [facility])  Staging form: Pharynx - Nasopharynx, AJCC 8th Edition  - Clinical: Stage ALEXI (cT4, cN1, cM0) - Signed by Estefanía Hebert MD on 2018           History of Present Illness   Juan Frey is a 80y o  year old male with locally advanced nasopharyngeal cancer  Available outside records suggest that up to 2 years prior there had been note made of a nasopharyngeal mass on outside scans, but no additional workup for definitive intervention was ever undertaken  He had been living home alone up until approximately 2 months ago when he began to experience a precipitous decline in performance status with significant unintentional weight loss leading to a hospitalization and workup as described below:    18  CT C/A/P for abnormal weight loss:    Patchy interstitial and groundglass opacities noted at the posterior right upper and lower lobes suspicious for bronchopneumonia  Scattered tree-in-bud opacities are also noted within both lower lobes likely related to an infectious or inflammatory bronchiolitis  3 mm pleural-based LLL pulmonary nodule  No mediastinal, intra-abdominal or pelvic lymphadenopathy  4 2 cm ascending thoracic aortic aneurysm  15 mm hyperdense lesion at the anterior mid left kidney likely representing a hemorrhagic or proteinaceous cyst   However, a solid neoplasm cannot be excluded  10/29/18  [facility] with recurrent falls  He was noted to be orthostatic on presentation related to dehydration from diuretic use for lower extremity edema  6-7 weeks of severe left sided nasal congestion with sinus pain which is not made better with Afrin    This makes breathing difficult, especially if he get right nasal congestion and must breathe through his mouth  Frontal headaches every day for the past 1-2 weeks  33lb weight loss in 3 months due to decreased appetite  · 11/1/18  [facility] Hospitalist Note:   Patient presents as a transfer from [facility] with a large nasopharyngeal mass with involvement of the skull base  Patient scheduled for neurosurgical evaluation/intervention with Dr Orly Torres  An MRI of the brain done at [facility] showed a large left-sided nasopharyngeal/skull base mass measuring 6 9 x 5 5 cm which had grown in size since 2016 when it was 3 3 x 2 8 cm  Patient reports he had been evaluated 2 years ago at [facility] and at that time no intervention was recommended  11/2/18  Neurosurgery Consult:  Recent falls with newly discovered atrial fibrillation  Incidentally a large nasopharyngeal mass was discovered on CT scan of the head [facility]  Recommend ENT evaluation and biopsy of nasopharyngeal mass via left nostril - likely carcinoma  No neurosurgical intervention  Family members have noticed a change in his voice and at times he can be hard to understand  11/4/18  Consult Dr Stephen Valdez:  He has some associated dysarthria  Swallowing status unknown  This may be lymphoma  It may be amenable to transnasal nasopharyngeal biopsy  It is possible that the tumor encasement of the carotid artery could be contributing to carotid insufficiency  I recommend evaluating this possibility with a CT angiogram of the head neck & then biopsy in our office  11/5/18  CTA H/N:    1  Redemonstration of large soft tissue mass centered at the left nasopharyngeal wall with multiregional extension to the oropharynx, left , carotid, and left parapharyngeal spaces  The mass encases distal left cervical internal carotid artery without significant narrowing  None of left internal jugular vein throughout its course in the neck    2   The remainder of the major cervical and intracranial arteries are patent  Moderate presumably atherosclerotic narrowing of mid M1 segment of left MCA  No aneurysm  3   A 1 cm left level 2A lymph node  4   Partially seen RUL nodular opacities, new from 8/30/2018, concerning for infection  Also mildly prominent mediastinal lymph nodes in visualized upper chest   5   Left thyroid lobe 1 1 cm nodule  11/5/18  Speech therapy note: Tolerating soft foods only and refusing to wear dentures due to poor fit  Downgrade to dysphagia level 2 c thin liquids  11/13/18   Nasopharynx, mass, biopsy:  Nonkeratinizing squamous cell carcinoma, undifferentiated  12/6/18  PET/CT:  1   Large FDG avid mass noted at the left posterior nasopharynx and oropharynx compatible with the known malignancy  2  Single FDG avid left level 2 lymph node compatible with metastasis  3  Focal FDG uptake at the left clivus suspicious for osseous metastasis  4   Mild patchy FDG uptake in the right lower lobe posteriorly where there is patchy consolidation on CT  This may be infectious or inflammatory  Mild debris noted in the trachea and right mainstem bronchus  Scattered debris in the right middle lobe   and right lower lobe bronchi  5   Mild FDG uptake in the right perihilar region, likely reactive secondary to the right lower lobe process  6  Scattered subcentimeter pulmonary nodules, stable from 8/30/2018 12/7/18  Consult Dr Amadou Piedra  12/10/18  Consult Dr Lu--cx'd due to transportation issues; changed to 12/27/18     1/3/18  Cysto with Dr Zoe Cho  Urinary retention with indwelling rosenberg in place  Today:  Since his discharge from the hospital, the patient has been residing in a nursing facility  He is accompanied today by his 2 daughters  His performance status has continued to decline, to the point where he is unable to ambulate or stand under his own power  He is increasingly somnolent    He spends most of his day in a wheelchair or bed  He also has minimal p o  intake which he attributes to decreased appetite, dysgeusia, dysphagia, lack of teeth, and poor quality of food at the nursing facility  He currently denies any otalgia, odynophagia, or pain of any sort  He has lost at least 60 lb  Historical Information      Nasopharyngeal cancer ([place] [address] )    11/13/2018 Initial Diagnosis     Nasopharyngeal cancer ([place] [address] )         11/13/2018 Biopsy      Nasopharynx, mass, biopsy:  -  Nonkeratinizing squamous cell carcinoma, undifferentiated  NOTE:  The sample show small portions of sinonasal mucosa with degenerative change and prominent chronic inflammation  Focal areas show infiltrating nests of undifferentiated epithelium with open chromatin, pleomorphism, prominent nucleoli, and irregular growth  Immunohistochemical stains performed with appropriate controls show the neoplastic cells to be positive for p40 and INGRID with a high Ki-67 proliferative rate (>70% overall) which are negative for p16 and mucicarmine  Overall, the findings are most consistent with nasopharyngeal squamous cell carcinoma undifferentiated type                  Past Medical History:   Diagnosis Date    Atrial fibrillation ([place] [address] )     Coronary artery disease     Diabetes mellitus ([place] [address] )     Heart disease     Hemoptysis     Hypertension     Nasopharynx cancer ([place] [address] )     Prostate cancer ([place] [address] )      Past Surgical History:   Procedure Laterality Date    BRONCHOSCOPY      CORONARY ANGIOPLASTY WITH STENT PLACEMENT      PROSTATECTOMY         Family History   Problem Relation Age of Onset    Heart disease Father        Social History   History   Alcohol Use No     History   Drug Use No     History   Smoking Status    Former Smoker    Packs/day: 1 50    Years: 40 00    Quit date: 12/31/2001   Smokeless Tobacco    Never Used         Meds/Allergies     Current Outpatient Prescriptions:     acetaminophen (TYLENOL) 325 mg tablet, Take 2 tablets (650 mg total) by mouth every 6 (six) hours as needed for mild pain or fever, Disp: 30 tablet, Rfl: 0    atorvastatin (LIPITOR) 40 mg tablet, Take 40 mg by mouth daily at bedtime, Disp: , Rfl:     bisacodyl (DULCOLAX) 10 mg suppository, Insert 10 mg into the rectum daily, Disp: , Rfl:     docusate sodium (COLACE) 100 mg capsule, Take 1 capsule (100 mg total) by mouth 2 (two) times a day, Disp: 10 capsule, Rfl: 0    escitalopram (LEXAPRO) 10 mg tablet, Take 5 mg by mouth daily, Disp: , Rfl:     lisinopril (ZESTRIL) 10 mg tablet, Take 1 tablet (10 mg total) by mouth daily at bedtime, Disp: , Rfl: 0    metoprolol succinate (TOPROL-XL) 50 mg 24 hr tablet, Take 1 tablet (50 mg total) by mouth daily at bedtime, Disp: , Rfl: 0    pantoprazole (PROTONIX) 40 mg tablet, Take 40 mg by mouth daily, Disp: , Rfl:     polyethylene glycol (MIRALAX) 17 g packet, Take 17 g by mouth daily, Disp: 14 each, Rfl: 0    potassium chloride (K-DUR,KLOR-CON) 20 mEq tablet, Take 1 tablet (20 mEq total) by mouth daily, Disp: , Rfl: 0    sodium chloride (OCEAN) 0 65 % nasal spray, 2 sprays into each nostril every 4 (four) hours as needed for congestion, Disp: 30 mL, Rfl: 0    tamsulosin (FLOMAX) 0 4 mg, Take 1 capsule (0 4 mg total) by mouth daily with dinner, Disp: , Rfl: 0    tuberculin 5 units/0 1 mL, Inject 5 Units into the skin once, Disp: , Rfl:     warfarin (COUMADIN) 5 mg tablet, Take 1 tablet (5 mg total) by mouth daily Do not give coumadin until ok by Dr Dorian Schroeder after biopsy, Disp: , Rfl: 0  Allergies   Allergen Reactions    Plavix [Clopidogrel] Rash         Review of Systems   Review of Systems   Constitutional: Positive for activity change, appetite change, fatigue, fever and unexpected weight change  HENT: Positive for hearing loss, nosebleeds, trouble swallowing and voice change  Cannot breathe through his nose  Adenturous  Eyes: Negative  Respiratory: Positive for cough and shortness of breath      Cardiovascular: Positive for palpitations and leg swelling  Gastrointestinal: Positive for constipation and diarrhea  Endocrine: Negative  Genitourinary: Positive for difficulty urinating  Rojas for urinary retention x 1 1/2 months   Musculoskeletal: Negative  Skin: Positive for color change and pallor  Allergic/Immunologic: Negative  Neurological: Positive for dizziness, tremors, syncope, speech difficulty (mumbles), weakness and light-headedness  Hematological: Bruises/bleeds easily  Psychiatric/Behavioral: Positive for agitation, behavioral problems (somulent; semi-comatose; difficult to awaken), decreased concentration, hallucinations and sleep disturbance  Screening  Tobacco  Current tobacco user: no  If yes, brief counseling provided: NA    Hypertension  Hypertension screening performed: yes  Normotensive:  yes  If no, referred to PCP: n/a    Depression Screening  Screened for depression using PHQ-2: yes    Screened for depression using PHQ-9:  no  Screening positive or negative:  negative  If score >4, was any of the following actions taken? Additional evaluation for depression, suicide risk assesment, referral to PCP or psychiatry, medication started:  n/a    Advanced Care Planning for Patients >65 years  Advanced Care Planning Discussed:  no  Patient named surrogate decision maker or care plan in chart: no        OBJECTIVE:   /54   Pulse 80   Temp (!) 97 2 °F (36 2 °C)   Resp 16   Ht 6' 1" (1 854 m)   SpO2 100%   BMI 23 62 kg/m²   Pain Assessment:  0  Performance Status: Karnofsky: 40 - Disabled; requires special care and assistance     Physical Exam the patient presents today no apparent distress  He is sitting in a wheelchair, often with the eyes closed and fairly somnolent  He does open his eyes and did participate for fairly long stretches in the conversation responding appropriately to questions    His speech is rather garbled due to the large oropharyngeal component of his mass   Examination of the oral cavity and oropharynx reveals rather dramatic bulging of the left soft palate without actual exophytic tumor visualized  He is edentulous  No palpable cervical or supraclavicular lymphadenopathy  Normal respiratory effort  RESULTS  Lab Results    Chemistry        Component Value Date/Time    K 3 6 11/10/2018 0613     11/10/2018 0613    CO2 28 11/10/2018 0613    BUN 19 11/10/2018 0613    CREATININE 1 00 11/10/2018 0613        Component Value Date/Time    CALCIUM 8 5 11/10/2018 0613            Lab Results   Component Value Date    WBC 5 89 11/10/2018    HGB 11 2 (L) 11/10/2018    HCT 36 3 (L) 11/10/2018    MCV 83 11/10/2018    PLT 89 (L) 11/10/2018         Imaging Studies  Nm Pet Ct Skull Base To Mid Thigh    Result Date: 12/7/2018  Narrative: WHOLE-BODY PET/CT SCAN INDICATION: Newly diagnosed nasopharyngeal cancer  Initial staging  J39 2: Other diseases of pharynx  Mass of nasopharynx [J39 2 (ICD-10-CM)] MODIFIER: PI COMPARISON: CTA head and neck of 11/15/2018, outside MRI of the from [facility] 11/1/2018 CELL TYPE:  non-keratinizing squamous cell carcinoma, undifferentiated (nasopharynx mass biopsy 11/13/18) TECHNIQUE:   The patient was given 1 0 mg Ativan via slow IV push prior to administration of the radiopharmaceutical  There were no adverse affects  Following the intravenous administration of 8 7 mCi F-18-FDG, dedicated head and neck images were obtained from the skull vertex to the thoracic inlet with the patient's arms at their side  Subsequently, whole body images were obtained from the thoracic inlet through the proximal femurs with the patients arms above their head  Multiplanar attenuation corrected and non attenuation corrected PET images are available for interpretation  Contiguous, low dose, axial CT sections were also obtained from the head through the thoracic inlet and from the thoracic inlet through the proximal femurs   Intravenous contrast material was not utilized  Additional coronal and sagittal images are available as well as fused PET/CT images  This examination, like all CT scans performed in the [facility], was performed utilizing techniques to minimize radiation dose exposure, including the use of iterative reconstruction and automated exposure control  Fasting serum glucose: 119 mg/dl FINDINGS: VISUALIZED BRAIN: No acute abnormalities are seen  HEAD/NECK:   Large FDG avid mass noted at the left posterior nasopharynx and oropharynx, SUV max of 11 0  Based on the PET images this measures approximately 6 7 x 4 1 cm  FDG activity and involve the region of the left palatine tonsil extends into the left  space and left carotid space  FDG activity is noted to extend to the right posterior oropharynx just crossing midline  The activity abuts the left mandible and extends around the styloid process  Single FDG avid left level 2 lymph node identified, SUV max of 3 9  This measures 1 2 x 0 6 cm image 42 series 3  No additional FDG avid left cervical chain lymph nodes  No FDG avid right cervical chain lymph nodes  CT images:  Mucosal thickening noted in the left maxillary sinus  CHEST:    Mild patchy FDG uptake in the right lower lobe posteriorly, SUV max of 3 1 where there is mild patchy consolidation on CT  This may be infectious or inflammatory  This is new from the prior exam   Previously there was patchy opacity more superiorly in the right lower lobe which has cleared  Mild FDG uptake noted in the right perihilar region, SUV max of 3 0  CT images: 5 mm perifissural nodule on the left image 53 series 3L  3 mm perifissural nodule noted on the left image 47 series 3   4 mm pleural-based nodule in the left lower lobe posteriorly image 48 series 3L  These are stable from the prior CT, not FDG avid but may be too small to characterize   Scattered clusters of tree-in-bud opacities and centrilobular nodules noted in the left lower lobe peripherally without significant FDG uptake may be infectious or inflammatory  This is similar to the prior exam   Scattered calcified granulomata  There  are scattered blebs  Mild debris noted in the trachea and right mainstem bronchus  Scattered debris in the right middle lobe and right lower lobe bronchi  10 mm anterior mediastinal cyst noted image 57 series 3 L, not FDG avid  Ascending thoracic aorta is borderline aneurysmal at 3 9 cm in diameter  Mild/moderate coronary artery calcifications  Trace right pleural effusion  ABDOMEN:   No FDG avid soft tissue lesions are seen  CT images: There is cholelithiasis  Left adrenal gland is thickened, stable, not FDG avid  Ectatic abdominal aorta  Scattered small renal cysts bilaterally  Hyperdense renal lesion at the left kidney midpole anteriorly measuring up to 1 7 cm in size  This is stable from prior CT but indeterminate on this exam due to normal excretion of FDG by the kidneys  PELVIS:   No FDG avid soft tissue lesions are seen  CT images: Rojas catheter decompressed the bladder  Prostatic seed implants identified  Prominent lipoma noted of the right upper thigh abutting the right inferior pubic ramus measuring up to 11 3 cm in size  Mild internal septations with scattered peripheral calcifications  This is not FDG avid  OSSEOUS STRUCTURES:  Focal FDG uptake noted at the left clivus, SUV max of 6 2 suspicious for osseous metastasis  No obvious findings here on the low-dose CT images  There may be slight signal abnormality here on the prior MRI  No additional FDG avid osseous lesions  CT images:  Multilevel degenerative spurring of the spine  Multiple old healed rib fractures on the left  Impression: 1  Large FDG avid mass noted at the left posterior nasopharynx and oropharynx compatible with the known malignancy  2  Single FDG avid left level 2 lymph node compatible with metastasis   3  Focal FDG uptake at the left clivus suspicious for osseous metastasis  4   Mild patchy FDG uptake in the right lower lobe posteriorly where there is patchy consolidation on CT  This may be infectious or inflammatory  Mild debris noted in the trachea and right mainstem bronchus  Scattered debris in the right middle lobe and right lower lobe bronchi  5   Mild FDG uptake in the right perihilar region, likely reactive secondary to the right lower lobe process  6  Scattered subcentimeter pulmonary nodules, stable from 8/30/2018  Recommend continued surveillance  The study was marked in EPIC for significant notification  Workstation performed: [id number]       ASSESSMENT  No diagnosis found  Cancer Staging  Nasopharyngeal cancer [facility])  Staging form: Pharynx - Nasopharynx, AJCC 8th Edition  - Clinical: Stage ALEXI (cT4, cN1, cM0) - Signed by Em Shahid MD on 12/7/2018        PLAN/DISCUSSION  No orders of the defined types were placed in this encounter  Lui French is a 80y o  year old male with recently diagnosed 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 with extensive involvement of the  space as well as clival invasion and extension of hypermetabolic activity into the base of skull  There is a large oropharyngeal component of his lesion with a single hypermetabolic ipsilateral level 2 lymph node but no evidence of distant metastatic disease  Typically, in this situation, we would recommend induction chemotherapy followed by definitive chemoradiation to a dose of 7000 cGy delivered over the course of 7 weeks  The typical risks and toxicities associated with extensive head and neck chemoradiation therapy were discussed with the patient and his family in detail  Currently, his performance status is extremely poor and continues to decline    He is unable to ambulate or even stand, has significantly decreased p o  intake, has an indwelling Rojas catheter, and is increasingly somnolent throughout the course of the day   Thankfully, he does not have any pain associated with his malignant process  I have serious concerns regarding his ability to tolerate any kind of definitive treatment  We are recommending that he meet with Medical Oncology so that they can assess whether not he would be a candidate for any kind of systemic therapy  I do not believe that radiation alone would be curative for this very large lesion  I do not believe it would be wise to attempt any radiation therapy without 1st having a feeding tube placed, which the family prefers to avoid  The benefit of any palliative radiation is questionable as the radiation itself would certainly affect negatively the quality of his remaining life  The option of hospice and supportive care alone was discussed at length and I believe would be very reasonable option at this point  The family seems to be leaning towards that option but the patient is hesitant, and is inquiring if there are any medical therapies that may be available and more tolerable  Again we have encouraged them to speak with Medical Oncology  Once they have a chance to speak with Medical Oncology and discuss issues further amongst themselves, they will contact our department should they wish to proceed with treatment  Justin Walker MD  12/7/2018,4:06 PM      Portions of the record may have been created with voice recognition software   Occasional wrong word or "sound a like" substitutions may have occurred due to the inherent limitations of voice recognition software   Read the chart carefully and recognize, using context, where substitutions have occurred

## 2018-12-07 NOTE — PROGRESS NOTES
Bereket Norah  1934  Mr Jaquelin Kaye is a 80 y o  male    Chief Complaint   Patient presents with    Consult     nasopharyngeal cancer   9/1/18  CT C/A/P for abnormal weight loss:    Patchy interstitial and groundglass opacities noted at the posterior right upper and lower lobes suspicious for bronchopneumonia  Scattered tree-in-bud opacities are also noted within both lower lobes likely related to an infectious or inflammatory bronchiolitis  3 mm pleural-based LLL pulmonary nodule  No mediastinal, intra-abdominal or pelvic lymphadenopathy  4 2 cm ascending thoracic aortic aneurysm  15 mm hyperdense lesion at the anterior mid left kidney likely representing a hemorrhagic or proteinaceous cyst   However, a solid neoplasm cannot be excluded  10/29/18  [facility] with recurrent falls  He was noted to be orthostatic on presentation related to dehydration from diuretic use for lower extremity edema  6-7 weeks of severe left sided nasal congestion with sinus pain which is not made better with Afrin  This makes breathing difficult, especially if he get right nasal congestion and must breathe through his mouth  Frontal headaches every day for the past 1-2 weeks  33lb weight loss in 3 months due to decreased appetite  · 11/1/18  [facility] Hospitalist Note:   Patient presents as a transfer from [facility] with a large nasopharyngeal mass with involvement of the skull base  Patient scheduled for neurosurgical evaluation/intervention with Dr Mary Sanchez  An MRI of the brain done at [facility] showed a large left-sided nasopharyngeal/skull base mass measuring 6 9 x 5 5 cm which had grown in size since 2016 when it was 3 3 x 2 8 cm  Patient reports he had been evaluated 2 years ago at [facility] and at that time no intervention was recommended  11/2/18  Neurosurgery Consult:  Recent falls with newly discovered atrial fibrillation    Incidentally a large nasopharyngeal mass was discovered on CT scan of the head [facility]  Recommend ENT evaluation and biopsy of nasopharyngeal mass via left nostril - likely carcinoma  No neurosurgical intervention  Family members have noticed a change in his voice and at times he can be hard to understand  11/4/18  Consult Dr Williams Alberto:  He has some associated dysarthria  Swallowing status unknown  This may be lymphoma  It may be amenable to transnasal nasopharyngeal biopsy  It is possible that the tumor encasement of the carotid artery could be contributing to carotid insufficiency  I recommend evaluating this possibility with a CT angiogram of the head neck & then biopsy in our office  11/5/18  CTA H/N:    1  Redemonstration of large soft tissue mass centered at the left nasopharyngeal wall with multiregional extension to the oropharynx, left , carotid, and left parapharyngeal spaces  The mass encases distal left cervical internal carotid artery without significant narrowing  None of left internal jugular vein throughout its course in the neck  2   The remainder of the major cervical and intracranial arteries are patent  Moderate presumably atherosclerotic narrowing of mid M1 segment of left MCA  No aneurysm  3   A 1 cm left level 2A lymph node  4   Partially seen RUL nodular opacities, new from 8/30/2018, concerning for infection  Also mildly prominent mediastinal lymph nodes in visualized upper chest   5   Left thyroid lobe 1 1 cm nodule  11/5/18  Speech therapy note: Tolerating soft foods only and refusing to wear dentures due to poor fit  Downgrade to dysphagia level 2 c thin liquids  11/13/18   Nasopharynx, mass, biopsy:  Nonkeratinizing squamous cell carcinoma, undifferentiated  12/6/18  PET/CT:  1   Large FDG avid mass noted at the left posterior nasopharynx and oropharynx compatible with the known malignancy    2  Single FDG avid left level 2 lymph node compatible with metastasis  3  Focal FDG uptake at the left clivus suspicious for osseous metastasis  4   Mild patchy FDG uptake in the right lower lobe posteriorly where there is patchy consolidation on CT  This may be infectious or inflammatory  Mild debris noted in the trachea and right mainstem bronchus  Scattered debris in the right middle lobe   and right lower lobe bronchi  5   Mild FDG uptake in the right perihilar region, likely reactive secondary to the right lower lobe process  6  Scattered subcentimeter pulmonary nodules, stable from 8/30/2018 12/7/18  Consult Dr Lela Bob  12/10/18  Consult Dr Lu--cx'd due to transportation issues; changed to 12/27/18     1/3/18  Cysto with Dr Judy Humphrey (urinary retention; prostatectomy in the past)  Cancer Staging  No matching staging information was found for the patient  Oncology History    9/1/18  CT C/A/P for abnormal weight loss:    Patchy interstitial and groundglass opacities noted at the posterior right upper and lower lobes suspicious for bronchopneumonia  Scattered tree-in-bud opacities are also noted within both lower lobes likely related to an infectious or inflammatory bronchiolitis  3 mm pleural-based LLL pulmonary nodule  No mediastinal, intra-abdominal or pelvic lymphadenopathy  4 2 cm ascending thoracic aortic aneurysm  15 mm hyperdense lesion at the anterior mid left kidney likely representing a hemorrhagic or proteinaceous cyst   However, a solid neoplasm cannot be excluded  10/29/18  [facility] with recurrent falls  He was noted to be orthostatic on presentation related to dehydration from diuretic use for lower extremity edema  6-7 weeks of severe left sided nasal congestion with sinus pain which is not made better with Afrin  This makes breathing difficult, especially if he get right nasal congestion and must breathe through his mouth  Frontal headaches every day for the past 1-2 weeks    33lb weight loss in 3 months due to decreased appetite  · 11/1/18  [facility] Hospitalist Note:   Patient presents as a transfer from [facility] with a large nasopharyngeal mass with involvement of the skull base  Patient scheduled for neurosurgical evaluation/intervention with Dr Radha Hallman  An MRI of the brain done at [facility] showed a large left-sided nasopharyngeal/skull base mass measuring 6 9 x 5 5 cm which had grown in size since 2016 when it was 3 3 x 2 8 cm  Patient reports he had been evaluated 2 years ago at [facility] and at that time no intervention was recommended  11/2/18  Neurosurgery Consult:  Recent falls with newly discovered atrial fibrillation  Incidentally a large nasoph[facility]  Recommend ENT evaluation and biopsy of nasopharyngeal mass via left nostril - likely carcinoma  No neurosurgical intervention  Family members have noticed a change in his voice and at times he can be hard to understand  11/4/18  Consult Dr Espinal Current:  He has some associated dysarthria  Swallowing status unknown  This may be lymphoma  It may be amenable to transnasal nasopharyngeal biopsy  It is possible that the tumor encasement of the carotid artery could be contributing to carotid insufficiency  I recommend evaluating this possibility with a CT angiogram of the head neck & then biopsy in our office  11/5/18  CTA H/N:    1  Redemonstration of large soft tissue mass centered at the left nasopharyngeal wall with multiregional extension to the oropharynx, left , carotid, and left parapharyngeal spaces  The mass encases distal left cervical internal carotid artery without significant narrowing  None of left internal jugular vein throughout its course in the neck  2   The remainder of the major cervical and intracranial arteries are patent    Moderate presumably atherosclerotic narrowing of mid M1 segment of left MCA  No aneurysm  3   A 1 cm left level 2A lymph node  4   Partially seen RUL nodular opacities, new from 8/30/2018, concerning for infection  Also mildly prominent mediastinal lymph nodes in visualized upper chest   5   Left thyroid lobe 1 1 cm nodule  11/5/18  Speech therapy note: Tolerating soft foods only and refusing to wear dentures due to poor fit  Downgrade to dysphagia level 2 c thin liquids  11/13/18   Nasopharynx, mass, biopsy:  Nonkeratinizing squamous cell carcinoma, undifferentiated  12/6/18  PET/CT:    12/10/18  Consult Dr Hyun Estes    1/3/18  Cysto with Dr Sonya Guerrero (urinary retention; prostatectomy in the past)  12/7/18  Consult Dr Flavia Pierce  Nasopharyngeal cancer ([facility] )    11/13/2018 Initial Diagnosis     Nasopharyngeal cancer ([facility] )         11/13/2018 Biopsy      Nasopharynx, mass, biopsy:  -  Nonkeratinizing squamous cell carcinoma, undifferentiated  NOTE:  The sample show small portions of sinonasal mucosa with degenerative change and prominent chronic inflammation  Focal areas show infiltrating nests of undifferentiated epithelium with open chromatin, pleomorphism, prominent nucleoli, and irregular growth  Immunohistochemical stains performed with appropriate controls show the neoplastic cells to be positive for p40 and INGRID with a high Ki-67 proliferative rate (>70% overall) which are negative for p16 and mucicarmine  Overall, the findings are most consistent with nasopharyngeal squamous cell carcinoma undifferentiated type  Clinical Trial: no    Screening  Tobacco  Current tobacco user: no  If yes, brief counseling provided: NA    Hypertension  Hypertension screening performed: yes  Normotensive:  yes  If no, referred to PCP: n/a    Depression Screening  Screened for depression using PHQ-2: yes    Screened for depression using PHQ-9:  no  Screening positive or negative:  negative  If score >4, was any of the following actions taken? Additional evaluation for depression, suicide risk assesment, referral to PCP or psychiatry, medication started:  n/a    Advanced Care Planning for Patients >65 years  Advanced Care Planning Discussed:  no  Patient named surrogate decision maker or care plan in chart: no        Health Maintenance   Topic Date Due    Depression Screening PHQ  1934    Diabetic Foot Exam  10/31/1944    DM Eye Exam  10/31/1944    URINE MICROALBUMIN  10/31/1944    DTaP,Tdap,and Td Vaccines (1 - Tdap) 10/31/1955    Fall Risk  10/31/1999    Pneumococcal PPSV23/PCV13 65+ Years / High and Highest Risk (1 of 2 - PCV13) 10/31/1999    INFLUENZA VACCINE  07/01/2018    HEMOGLOBIN A1C  05/02/2019       Patient Active Problem List   Diagnosis    Nasopharyngeal mass    Orthostatic hypotension    New onset atrial flutter    Diabetes mellitus type 2    Essential hypertension    Recurrent falls    CAD (coronary artery disease)    Moderate protein-calorie malnutrition ([address] )    Urinary retention    Thyroid nodule    [facility]oph[facility] ([facility] )     Past Medical History:   Diagnosis Date    Atrial fibrillation ([address] )     Coronary artery disease     Diabetes mellitus ([address] )     Heart disease     Hemoptysis     Hypertension     Nasopharynx cancer ([address] )     Prostate cancer ([facility] )      Past Surgical History:   Procedure Laterality Date    BRONCHOSCOPY      CORONARY ANGIOPLASTY WITH STENT PLACEMENT      PROSTATECTOMY       Family History   Problem Relation Age of Onset    Heart disease Father      Social History     Social History    Marital status: /Civil Union     Spouse name: N/A    Number of children: N/A    Years of education: N/A     Occupational History    Not on file       Social History Main Topics    Smoking status: Former Smoker     Packs/day: 1 50     Years: 40 00     Quit date: 12/31/2001    Smokeless tobacco: Never Used    Alcohol use No    Drug use: No    Sexual activity: Not on file     Other Topics Concern    Not on file     Social History Narrative    No narrative on file       Current Outpatient Prescriptions:     acetaminophen (TYLENOL) 325 mg tablet, Take 2 tablets (650 mg total) by mouth every 6 (six) hours as needed for mild pain or fever, Disp: 30 tablet, Rfl: 0    atorvastatin (LIPITOR) 40 mg tablet, Take 40 mg by mouth daily at bedtime, Disp: , Rfl:     bisacodyl (DULCOLAX) 10 mg suppository, Insert 10 mg into the rectum daily, Disp: , Rfl:     docusate sodium (COLACE) 100 mg capsule, Take 1 capsule (100 mg total) by mouth 2 (two) times a day, Disp: 10 capsule, Rfl: 0    escitalopram (LEXAPRO) 10 mg tablet, Take 5 mg by mouth daily, Disp: , Rfl:     lisinopril (ZESTRIL) 10 mg tablet, Take 1 tablet (10 mg total) by mouth daily at bedtime, Disp: , Rfl: 0    metoprolol succinate (TOPROL-XL) 50 mg 24 hr tablet, Take 1 tablet (50 mg total) by mouth daily at bedtime, Disp: , Rfl: 0    pantoprazole (PROTONIX) 40 mg tablet, Take 40 mg by mouth daily, Disp: , Rfl:     polyethylene glycol (MIRALAX) 17 g packet, Take 17 g by mouth daily, Disp: 14 each, Rfl: 0    potassium chloride (K-DUR,KLOR-CON) 20 mEq tablet, Take 1 tablet (20 mEq total) by mouth daily, Disp: , Rfl: 0    sodium chloride (OCEAN) 0 65 % nasal spray, 2 sprays into each nostril every 4 (four) hours as needed for congestion, Disp: 30 mL, Rfl: 0    tamsulosin (FLOMAX) 0 4 mg, Take 1 capsule (0 4 mg total) by mouth daily with dinner, Disp: , Rfl: 0    tuberculin 5 units/0 1 mL, Inject 5 Units into the skin once, Disp: , Rfl:     warfarin (COUMADIN) 5 mg tablet, Take 1 tablet (5 mg total) by mouth daily Do not give coumadin until ok by Dr Barbie Cabot after biopsy, Disp: , Rfl: 0    Allergies   Allergen Reactions    Plavix [Clopidogrel] Rash       Review of Systems:  Review of Systems   Constitutional: Positive for activity change, appetite change, fatigue, fever and unexpected weight change     HENT: Positive for hearing loss, nosebleeds, trouble swallowing and voice change  Cannot breathe through his nose  Adenturous  Eyes: Negative  Respiratory: Positive for cough and shortness of breath  Cardiovascular: Positive for palpitations and leg swelling  Gastrointestinal: Positive for constipation and diarrhea  Endocrine: Negative  Genitourinary: Positive for difficulty urinating  Rojas for urinary retention x 1 1/2 months   Musculoskeletal: Negative  Skin: Positive for color change and pallor  Allergic/Immunologic: Negative  Neurological: Positive for dizziness, tremors, syncope, speech difficulty (mumbles), weakness and light-headedness  Hematological: Bruises/bleeds easily  Psychiatric/Behavioral: Positive for agitation, behavioral problems (somulent; semi-comatose; difficult to awaken), decreased concentration, hallucinations and sleep disturbance  Vitals:    12/07/18 1334   BP: 110/54   Pulse: 80   Resp: 16   Temp: (!) 97 2 °F (36 2 °C)   SpO2: 100%   Height: 6' 1" (1 854 m)       Pain Score: 0-No pain    Imaging:Nm Pet Ct Skull Base To Mid Thigh    Result Date: 12/7/2018  Narrative: WHOLE-BODY PET/CT SCAN INDICATION: Newly diagnosed nasopharyngeal cancer  Initial staging  J39 2: Other diseases of pharynx  Mass of nasopharynx [J39 2 (ICD-10-CM)] MODIFIER: PI COMPARISON: CTA head and neck of 11/15/2018, outside MRI of the from [facility] 11/1/2018 CELL TYPE:  non-keratinizing squamous cell carcinoma, undifferentiated (nasopharynx mass biopsy 11/13/18) TECHNIQUE:   The patient was given 1 0 mg Ativan via slow IV push prior to administration of the radiopharmaceutical  There were no adverse affects  Following the intravenous administration of 8 7 mCi F-18-FDG, dedicated head and neck images were obtained from the skull vertex to the thoracic inlet with the patient's arms at their side   Subsequently, whole body images were obtained from the thoracic inlet through the proximal femurs with the patients arms above their head  Multiplanar attenuation corrected and non attenuation corrected PET images are available for interpretation  Contiguous, low dose, axial CT sections were also obtained from the head through the thoracic inlet and from the thoracic inlet through the proximal femurs  Intravenous contrast material was not utilized  Additional coronal and sagittal images are available as well as fused PET/CT images  This examination, like all CT scans performed in the [facility], was performed utilizing techniques to minimize radiation dose exposure, including the use of iterative reconstruction and automated exposure control  Fasting serum glucose: 119 mg/dl FINDINGS: VISUALIZED BRAIN: No acute abnormalities are seen  HEAD/NECK:   Large FDG avid mass noted at the left posterior nasopharynx and oropharynx, SUV max of 11 0  Based on the PET images this measures approximately 6 7 x 4 1 cm  FDG activity and involve the region of the left palatine tonsil extends into the left  space and left carotid space  FDG activity is noted to extend to the right posterior oropharynx just crossing midline  The activity abuts the left mandible and extends around the styloid process  Single FDG avid left level 2 lymph node identified, SUV max of 3 9  This measures 1 2 x 0 6 cm image 42 series 3  No additional FDG avid left cervical chain lymph nodes  No FDG avid right cervical chain lymph nodes  CT images:  Mucosal thickening noted in the left maxillary sinus  CHEST:    Mild patchy FDG uptake in the right lower lobe posteriorly, SUV max of 3 1 where there is mild patchy consolidation on CT  This may be infectious or inflammatory  This is new from the prior exam   Previously there was patchy opacity more superiorly in the right lower lobe which has cleared  Mild FDG uptake noted in the right perihilar region, SUV max of 3 0    CT images: 5 mm perifissural nodule on the left image 53 series 3L  3 mm perifissural nodule noted on the left image 47 series 3   4 mm pleural-based nodule in the left lower lobe posteriorly image 48 series 3L  These are stable from the prior CT, not FDG avid but may be too small to characterize  Scattered clusters of tree-in-bud opacities and centrilobular nodules noted in the left lower lobe peripherally without significant FDG uptake may be infectious or inflammatory  This is similar to the prior exam   Scattered calcified granulomata  There  are scattered blebs  Mild debris noted in the trachea and right mainstem bronchus  Scattered debris in the right middle lobe and right lower lobe bronchi  10 mm anterior mediastinal cyst noted image 57 series 3 L, not FDG avid  Ascending thoracic aorta is borderline aneurysmal at 3 9 cm in diameter  Mild/moderate coronary artery calcifications  Trace right pleural effusion  ABDOMEN:   No FDG avid soft tissue lesions are seen  CT images: There is cholelithiasis  Left adrenal gland is thickened, stable, not FDG avid  Ectatic abdominal aorta  Scattered small renal cysts bilaterally  Hyperdense renal lesion at the left kidney midpole anteriorly measuring up to 1 7 cm in size  This is stable from prior CT but indeterminate on this exam due to normal excretion of FDG by the kidneys  PELVIS:   No FDG avid soft tissue lesions are seen  CT images: Rojas catheter decompressed the bladder  Prostatic seed implants identified  Prominent lipoma noted of the right upper thigh abutting the right inferior pubic ramus measuring up to 11 3 cm in size  Mild internal septations with scattered peripheral calcifications  This is not FDG avid  OSSEOUS STRUCTURES:  Focal FDG uptake noted at the left clivus, SUV max of 6 2 suspicious for osseous metastasis  No obvious findings here on the low-dose CT images  There may be slight signal abnormality here on the prior MRI  No additional FDG avid osseous lesions   CT images:  Multilevel degenerative spurring of the spine  Multiple old healed rib fractures on the left  Impression: 1  Large FDG avid mass noted at the left posterior nasopharynx and oropharynx compatible with the known malignancy  2  Single FDG avid left level 2 lymph node compatible with metastasis  3  Focal FDG uptake at the left clivus suspicious for osseous metastasis  4   Mild patchy FDG uptake in the right lower lobe posteriorly where there is patchy consolidation on CT  This may be infectious or inflammatory  Mild debris noted in the trachea and right mainstem bronchus  Scattered debris in the right middle lobe and right lower lobe bronchi  5   Mild FDG uptake in the right perihilar region, likely reactive secondary to the right lower lobe process  6  Scattered subcentimeter pulmonary nodules, stable from 8/30/2018  Recommend continued surveillance  The study was marked in EPIC for significant notification  Workstation performed: [id number]       Teaching:  Not done due to pt semi-comatose status

## 2018-12-12 ENCOUNTER — TELEPHONE (OUTPATIENT)
Dept: PALLIATIVE MEDICINE | Facility: CLINIC | Age: 83
End: 2018-12-12

## 2018-12-23 PROBLEM — R53.81 DECLINING FUNCTIONAL STATUS: Status: ACTIVE | Noted: 2018-12-23

## 2018-12-23 PROBLEM — R63.4 WEIGHT LOSS, ABNORMAL: Status: ACTIVE | Noted: 2018-12-23

## 2023-01-02 NOTE — PROGRESS NOTES
Progress Note - Neurosurgery   Chen Nazario 80 y o  male MRN: [id number]  Unit/Bed#: [facility] 620-01 Encounter: [id number]    Assessment:  1  Nasopharyngeal mass with involvement of the skull base  2  HTN  3  New onset a-fib  4  Orthostatic hyptension  5  Recurrent falls  6  Diabetes mellitus type II      Plan:  -ENT consultation for possible biopsy  -recommend speech swallow eval  -CT chest abdomen and pelvis does not show any obvious primary neoplasm      Subjective/Objective     Resting comfortably with no complaints      Intake/Output       11/03/18 0701 - 11/04/18 0700      [id number] [id number] Total       Intake    P O   360  -- 360    Total Intake 360 -- 360       Output    Urine  622  -- 622    Urine 200 -- 200    Weighted Urine Output (mL) 422 -- 422    Total Output 622 -- 622       Net I/O     -262 -- -262          Invasive Devices     Peripheral Intravenous Line            Peripheral IV 11/02/18 Right Forearm 1 day                Physical Exam:    Vitals: Blood pressure 150/70, pulse 78, temperature 98 2 °F (36 8 °C), temperature source Oral, resp  rate 20, height 6' 1" (1 854 m), weight 88 5 kg (195 lb), SpO2 97 %  ,Body mass index is 25 73 kg/m²  Awake and alert  Moves all extremities  Facial features symmetric  Tongue protrudes in the midline  Pallet raises abnormally on the left  Speech is moist  Lungs coarse breath sounds  Heart regular rate  Abdomen soft      Lab Results: I have personally reviewed pertinent results  Imaging Studies: I have personally reviewed pertinent reports          VTE Pharmacologic Prophylaxis: Warfarin (Coumadin) held    VTE Mechanical Prophylaxis: sequential compression device show